# Patient Record
Sex: FEMALE | Race: WHITE | NOT HISPANIC OR LATINO | URBAN - METROPOLITAN AREA
[De-identification: names, ages, dates, MRNs, and addresses within clinical notes are randomized per-mention and may not be internally consistent; named-entity substitution may affect disease eponyms.]

---

## 2017-01-14 ENCOUNTER — INPATIENT (INPATIENT)
Facility: HOSPITAL | Age: 72
LOS: 8 days | Discharge: NURSING, REHAB OR LONG-TERM CARE | DRG: 193 | End: 2017-01-23
Attending: INTERNAL MEDICINE | Admitting: INTERNAL MEDICINE
Payer: MEDICARE

## 2017-01-14 VITALS — OXYGEN SATURATION: 100 % | HEART RATE: 111 BPM

## 2017-01-14 DIAGNOSIS — J80 ACUTE RESPIRATORY DISTRESS SYNDROME: ICD-10-CM

## 2017-01-14 DIAGNOSIS — J96.21 ACUTE AND CHRONIC RESPIRATORY FAILURE WITH HYPOXIA: ICD-10-CM

## 2017-01-14 DIAGNOSIS — J18.9 PNEUMONIA, UNSPECIFIED ORGANISM: ICD-10-CM

## 2017-01-14 PROCEDURE — 71010: CPT | Mod: 26

## 2017-01-14 PROCEDURE — 93010 ELECTROCARDIOGRAM REPORT: CPT

## 2017-01-14 PROCEDURE — 99285 EMERGENCY DEPT VISIT HI MDM: CPT

## 2017-01-14 RX ORDER — ALPRAZOLAM 0.25 MG
0.25 TABLET ORAL DAILY
Refills: 0 | Status: DISCONTINUED | OUTPATIENT
Start: 2017-01-14 | End: 2017-01-21

## 2017-01-14 RX ORDER — SODIUM CHLORIDE 9 MG/ML
1000 INJECTION INTRAMUSCULAR; INTRAVENOUS; SUBCUTANEOUS
Refills: 0 | Status: DISCONTINUED | OUTPATIENT
Start: 2017-01-14 | End: 2017-01-18

## 2017-01-14 RX ORDER — POLYETHYLENE GLYCOL 3350 17 G/17G
17 POWDER, FOR SOLUTION ORAL DAILY
Refills: 0 | Status: DISCONTINUED | OUTPATIENT
Start: 2017-01-14 | End: 2017-01-23

## 2017-01-14 RX ORDER — FLUTICASONE PROPIONATE 50 MCG
1 SPRAY, SUSPENSION NASAL DAILY
Refills: 0 | Status: DISCONTINUED | OUTPATIENT
Start: 2017-01-14 | End: 2017-01-23

## 2017-01-14 RX ORDER — ACETAMINOPHEN 500 MG
650 TABLET ORAL EVERY 6 HOURS
Refills: 0 | Status: DISCONTINUED | OUTPATIENT
Start: 2017-01-14 | End: 2017-01-23

## 2017-01-14 RX ORDER — VANCOMYCIN HCL 1 G
1000 VIAL (EA) INTRAVENOUS EVERY 12 HOURS
Refills: 0 | Status: DISCONTINUED | OUTPATIENT
Start: 2017-01-14 | End: 2017-01-16

## 2017-01-14 RX ORDER — ENOXAPARIN SODIUM 100 MG/ML
30 INJECTION SUBCUTANEOUS DAILY
Refills: 0 | Status: DISCONTINUED | OUTPATIENT
Start: 2017-01-14 | End: 2017-01-20

## 2017-01-14 RX ORDER — TRAZODONE HCL 50 MG
25 TABLET ORAL AT BEDTIME
Refills: 0 | Status: DISCONTINUED | OUTPATIENT
Start: 2017-01-14 | End: 2017-01-23

## 2017-01-14 RX ORDER — MONTELUKAST 4 MG/1
10 TABLET, CHEWABLE ORAL DAILY
Refills: 0 | Status: DISCONTINUED | OUTPATIENT
Start: 2017-01-14 | End: 2017-01-23

## 2017-01-14 RX ORDER — FLUTICASONE PROPIONATE AND SALMETEROL 50; 250 UG/1; UG/1
1 POWDER ORAL; RESPIRATORY (INHALATION)
Refills: 0 | Status: DISCONTINUED | OUTPATIENT
Start: 2017-01-14 | End: 2017-01-15

## 2017-01-14 RX ORDER — FAMOTIDINE 10 MG/ML
40 INJECTION INTRAVENOUS DAILY
Refills: 0 | Status: DISCONTINUED | OUTPATIENT
Start: 2017-01-14 | End: 2017-01-23

## 2017-01-14 RX ORDER — IPRATROPIUM/ALBUTEROL SULFATE 18-103MCG
3 AEROSOL WITH ADAPTER (GRAM) INHALATION EVERY 6 HOURS
Refills: 0 | Status: DISCONTINUED | OUTPATIENT
Start: 2017-01-14 | End: 2017-01-23

## 2017-01-14 RX ORDER — GABAPENTIN 400 MG/1
300 CAPSULE ORAL THREE TIMES A DAY
Refills: 0 | Status: DISCONTINUED | OUTPATIENT
Start: 2017-01-14 | End: 2017-01-23

## 2017-01-14 RX ADMIN — Medication 40 MILLIGRAM(S): at 14:59

## 2017-01-14 RX ADMIN — ENOXAPARIN SODIUM 30 MILLIGRAM(S): 100 INJECTION SUBCUTANEOUS at 15:16

## 2017-01-14 RX ADMIN — SODIUM CHLORIDE 100 MILLILITER(S): 9 INJECTION INTRAMUSCULAR; INTRAVENOUS; SUBCUTANEOUS at 18:12

## 2017-01-14 RX ADMIN — SODIUM CHLORIDE 100 MILLILITER(S): 9 INJECTION INTRAMUSCULAR; INTRAVENOUS; SUBCUTANEOUS at 14:59

## 2017-01-14 RX ADMIN — Medication 1 DROP(S): at 18:12

## 2017-01-14 RX ADMIN — Medication 0.25 MILLIGRAM(S): at 15:25

## 2017-01-14 RX ADMIN — Medication 250 MILLIGRAM(S): at 23:28

## 2017-01-14 RX ADMIN — GABAPENTIN 300 MILLIGRAM(S): 400 CAPSULE ORAL at 15:16

## 2017-01-14 RX ADMIN — Medication 3 MILLILITER(S): at 23:28

## 2017-01-14 RX ADMIN — Medication 1 TABLET(S): at 15:16

## 2017-01-14 RX ADMIN — GABAPENTIN 300 MILLIGRAM(S): 400 CAPSULE ORAL at 21:34

## 2017-01-14 RX ADMIN — FLUTICASONE PROPIONATE AND SALMETEROL 1 DOSE(S): 50; 250 POWDER ORAL; RESPIRATORY (INHALATION) at 18:15

## 2017-01-14 RX ADMIN — Medication 40 MILLIGRAM(S): at 21:34

## 2017-01-14 RX ADMIN — Medication 3 MILLILITER(S): at 19:41

## 2017-01-14 RX ADMIN — Medication 1 SPRAY(S): at 15:16

## 2017-01-14 RX ADMIN — MONTELUKAST 10 MILLIGRAM(S): 4 TABLET, CHEWABLE ORAL at 15:17

## 2017-01-14 RX ADMIN — FAMOTIDINE 40 MILLIGRAM(S): 10 INJECTION INTRAVENOUS at 15:16

## 2017-01-14 RX ADMIN — Medication 25 MILLIGRAM(S): at 21:34

## 2017-01-14 NOTE — H&P ADULT. - HISTORY OF PRESENT ILLNESS
This is a 70 YO W F transferred from SNF because of SOB. According to son he noticed for last few days that her breathing was not normal and possibly she was taking nebulizer treatment properly. She had  fever and she was coughing. This is a 70 YO W F transferred from SNF because of SOB. According to son he noticed for last few days that her breathing was not normal and possibly she was not getting her nebulizer treatment. She had  fever and she was coughing.

## 2017-01-15 LAB
ANION GAP SERPL CALC-SCNC: 6 MMOL/L — SIGNIFICANT CHANGE UP (ref 5–17)
ANISOCYTOSIS BLD QL: SLIGHT — SIGNIFICANT CHANGE UP
BASE EXCESS BLDA CALC-SCNC: 3.7 MMOL/L — HIGH (ref -2–2)
BLOOD GAS SOURCE: SIGNIFICANT CHANGE UP
BUN SERPL-MCNC: 16 MG/DL — SIGNIFICANT CHANGE UP (ref 7–23)
CALCIUM SERPL-MCNC: 8 MG/DL — LOW (ref 8.4–10.5)
CHLORIDE SERPL-SCNC: 104 MMOL/L — SIGNIFICANT CHANGE UP (ref 96–108)
CO2 SERPL-SCNC: 30 MMOL/L — SIGNIFICANT CHANGE UP (ref 22–31)
CREAT SERPL-MCNC: 0.72 MG/DL — SIGNIFICANT CHANGE UP (ref 0.5–1.3)
GLUCOSE SERPL-MCNC: 179 MG/DL — HIGH (ref 70–99)
HCO3 BLDA-SCNC: 27 MMOL/L — SIGNIFICANT CHANGE UP (ref 21–29)
HCT VFR BLD CALC: 33 % — LOW (ref 34.5–45)
HCT VFR BLD CALC: 34.4 % — LOW (ref 34.5–45)
HGB BLD-MCNC: 11 G/DL — LOW (ref 11.5–15.5)
HGB BLD-MCNC: 11.6 G/DL — SIGNIFICANT CHANGE UP (ref 11.5–15.5)
HOROWITZ INDEX BLDA+IHG-RTO: SIGNIFICANT CHANGE UP
HYPOCHROMIA BLD QL: SLIGHT — SIGNIFICANT CHANGE UP
LACTATE SERPL-SCNC: 1.2 MMOL/L — SIGNIFICANT CHANGE UP (ref 0.7–2)
LYMPHOCYTES # BLD AUTO: 1 % — LOW (ref 13–44)
MANUAL DIF COMMENT BLD-IMP: SIGNIFICANT CHANGE UP
MANUAL SMEAR VERIFICATION: SIGNIFICANT CHANGE UP
MCHC RBC-ENTMCNC: 30.5 PG — SIGNIFICANT CHANGE UP (ref 27–34)
MCHC RBC-ENTMCNC: 31.2 PG — SIGNIFICANT CHANGE UP (ref 27–34)
MCHC RBC-ENTMCNC: 33.2 GM/DL — SIGNIFICANT CHANGE UP (ref 32–36)
MCHC RBC-ENTMCNC: 33.8 GM/DL — SIGNIFICANT CHANGE UP (ref 32–36)
MCV RBC AUTO: 92 FL — SIGNIFICANT CHANGE UP (ref 80–100)
MCV RBC AUTO: 92.3 FL — SIGNIFICANT CHANGE UP (ref 80–100)
MONOCYTES NFR BLD AUTO: 1 % — LOW (ref 2–14)
NEUTROPHILS NFR BLD AUTO: 89 % — HIGH (ref 43–77)
NEUTS BAND # BLD: 9 % — HIGH (ref 0–8)
PCO2 BLDA: 58 MMHG — HIGH (ref 32–46)
PH BLD: 7.33 — LOW (ref 7.35–7.45)
PLAT MORPH BLD: NORMAL — SIGNIFICANT CHANGE UP
PLATELET # BLD AUTO: 215 K/UL — SIGNIFICANT CHANGE UP (ref 150–400)
PLATELET # BLD AUTO: 246 K/UL — SIGNIFICANT CHANGE UP (ref 150–400)
PO2 BLDA: 124 MMHG — HIGH (ref 74–108)
POTASSIUM SERPL-MCNC: 4 MMOL/L — SIGNIFICANT CHANGE UP (ref 3.5–5.3)
POTASSIUM SERPL-SCNC: 4 MMOL/L — SIGNIFICANT CHANGE UP (ref 3.5–5.3)
PROCALCITONIN SERPL-MCNC: 2.28 NG/ML — HIGH (ref 0–0.05)
RBC # BLD: 3.59 M/UL — LOW (ref 3.8–5.2)
RBC # BLD: 3.72 M/UL — LOW (ref 3.8–5.2)
RBC # FLD: 13.5 % — SIGNIFICANT CHANGE UP (ref 10.3–14.5)
RBC # FLD: 13.5 % — SIGNIFICANT CHANGE UP (ref 10.3–14.5)
RBC BLD AUTO: SIGNIFICANT CHANGE UP
SAO2 % BLDA: 99 % — HIGH (ref 92–96)
SODIUM SERPL-SCNC: 140 MMOL/L — SIGNIFICANT CHANGE UP (ref 135–145)
VANCOMYCIN TROUGH SERPL-MCNC: 14 UG/ML — SIGNIFICANT CHANGE UP (ref 10–20)
WBC # BLD: 15.7 K/UL — HIGH (ref 3.8–10.5)
WBC # BLD: 19.5 K/UL — HIGH (ref 3.8–10.5)
WBC # FLD AUTO: 15.7 K/UL — HIGH (ref 3.8–10.5)
WBC # FLD AUTO: 19.5 K/UL — HIGH (ref 3.8–10.5)

## 2017-01-15 PROCEDURE — 71010: CPT | Mod: 26

## 2017-01-15 RX ORDER — BUDESONIDE, MICRONIZED 100 %
0.5 POWDER (GRAM) MISCELLANEOUS
Refills: 0 | Status: DISCONTINUED | OUTPATIENT
Start: 2017-01-15 | End: 2017-01-21

## 2017-01-15 RX ORDER — ALPRAZOLAM 0.25 MG
0.25 TABLET ORAL ONCE
Refills: 0 | Status: DISCONTINUED | OUTPATIENT
Start: 2017-01-15 | End: 2017-01-15

## 2017-01-15 RX ORDER — CEFEPIME 1 G/1
INJECTION, POWDER, FOR SOLUTION INTRAMUSCULAR; INTRAVENOUS
Refills: 0 | Status: DISCONTINUED | OUTPATIENT
Start: 2017-01-15 | End: 2017-01-18

## 2017-01-15 RX ORDER — IPRATROPIUM/ALBUTEROL SULFATE 18-103MCG
3 AEROSOL WITH ADAPTER (GRAM) INHALATION EVERY 4 HOURS
Refills: 0 | Status: DISCONTINUED | OUTPATIENT
Start: 2017-01-15 | End: 2017-01-23

## 2017-01-15 RX ORDER — CEFEPIME 1 G/1
2000 INJECTION, POWDER, FOR SOLUTION INTRAMUSCULAR; INTRAVENOUS ONCE
Refills: 0 | Status: COMPLETED | OUTPATIENT
Start: 2017-01-15 | End: 2017-01-15

## 2017-01-15 RX ORDER — HYDROCORTISONE 20 MG
125 TABLET ORAL ONCE
Refills: 0 | Status: DISCONTINUED | OUTPATIENT
Start: 2017-01-15 | End: 2017-01-15

## 2017-01-15 RX ORDER — CEFEPIME 1 G/1
2000 INJECTION, POWDER, FOR SOLUTION INTRAMUSCULAR; INTRAVENOUS EVERY 24 HOURS
Refills: 0 | Status: DISCONTINUED | OUTPATIENT
Start: 2017-01-16 | End: 2017-01-18

## 2017-01-15 RX ADMIN — SODIUM CHLORIDE 40 MILLILITER(S): 9 INJECTION INTRAMUSCULAR; INTRAVENOUS; SUBCUTANEOUS at 16:30

## 2017-01-15 RX ADMIN — Medication 125 MILLIGRAM(S): at 10:12

## 2017-01-15 RX ADMIN — Medication 1 DROP(S): at 05:51

## 2017-01-15 RX ADMIN — Medication 40 MILLIGRAM(S): at 14:43

## 2017-01-15 RX ADMIN — Medication 1 DROP(S): at 18:21

## 2017-01-15 RX ADMIN — GABAPENTIN 300 MILLIGRAM(S): 400 CAPSULE ORAL at 22:13

## 2017-01-15 RX ADMIN — GABAPENTIN 300 MILLIGRAM(S): 400 CAPSULE ORAL at 14:43

## 2017-01-15 RX ADMIN — Medication 3 MILLILITER(S): at 23:54

## 2017-01-15 RX ADMIN — Medication 0.25 MILLIGRAM(S): at 22:13

## 2017-01-15 RX ADMIN — Medication 40 MILLIGRAM(S): at 22:13

## 2017-01-15 RX ADMIN — Medication 3 MILLILITER(S): at 07:18

## 2017-01-15 RX ADMIN — Medication 0.25 MILLIGRAM(S): at 11:46

## 2017-01-15 RX ADMIN — FAMOTIDINE 40 MILLIGRAM(S): 10 INJECTION INTRAVENOUS at 11:46

## 2017-01-15 RX ADMIN — Medication 250 MILLIGRAM(S): at 12:49

## 2017-01-15 RX ADMIN — Medication 40 MILLIGRAM(S): at 05:52

## 2017-01-15 RX ADMIN — Medication 3 MILLILITER(S): at 20:08

## 2017-01-15 RX ADMIN — Medication 250 MILLIGRAM(S): at 23:36

## 2017-01-15 RX ADMIN — Medication 0.5 MILLIGRAM(S): at 20:08

## 2017-01-15 RX ADMIN — Medication 3 MILLILITER(S): at 09:15

## 2017-01-15 RX ADMIN — Medication 1 TABLET(S): at 11:46

## 2017-01-15 RX ADMIN — FLUTICASONE PROPIONATE AND SALMETEROL 1 DOSE(S): 50; 250 POWDER ORAL; RESPIRATORY (INHALATION) at 05:52

## 2017-01-15 RX ADMIN — Medication 3 MILLILITER(S): at 13:53

## 2017-01-15 RX ADMIN — MONTELUKAST 10 MILLIGRAM(S): 4 TABLET, CHEWABLE ORAL at 11:46

## 2017-01-15 RX ADMIN — CEFEPIME 100 MILLIGRAM(S): 1 INJECTION, POWDER, FOR SOLUTION INTRAMUSCULAR; INTRAVENOUS at 11:52

## 2017-01-15 RX ADMIN — ENOXAPARIN SODIUM 30 MILLIGRAM(S): 100 INJECTION SUBCUTANEOUS at 11:45

## 2017-01-15 RX ADMIN — GABAPENTIN 300 MILLIGRAM(S): 400 CAPSULE ORAL at 05:51

## 2017-01-15 RX ADMIN — Medication 650 MILLIGRAM(S): at 11:00

## 2017-01-15 RX ADMIN — Medication 25 MILLIGRAM(S): at 22:13

## 2017-01-15 NOTE — PROVIDER CONTACT NOTE (EICU) - BACKGROUND
Pt admitted to [facility] ICU from the floor for worsened resp status. placed on bipap on the floor.discussed with the nurse  to get an abg, repeat labs and to notify Dr. Ortiz that his patient is now in the ICU with worsened status. She looks to be in mild to mod distress. +accessory muscle use and tachypnea. ill fitting BIPAP mask due to facial structure. Attempting to find one to get a better fit. decreased IVF. she is on abx, steroids and bronchodilators.

## 2017-01-16 LAB
ANION GAP SERPL CALC-SCNC: 4 MMOL/L — LOW (ref 5–17)
BASOPHILS # BLD AUTO: 0 K/UL — SIGNIFICANT CHANGE UP (ref 0–0.2)
BASOPHILS NFR BLD AUTO: 0.2 % — SIGNIFICANT CHANGE UP (ref 0–2)
BUN SERPL-MCNC: 14 MG/DL — SIGNIFICANT CHANGE UP (ref 7–23)
CALCIUM SERPL-MCNC: 8.2 MG/DL — LOW (ref 8.4–10.5)
CHLORIDE SERPL-SCNC: 106 MMOL/L — SIGNIFICANT CHANGE UP (ref 96–108)
CO2 SERPL-SCNC: 33 MMOL/L — HIGH (ref 22–31)
CREAT SERPL-MCNC: 0.67 MG/DL — SIGNIFICANT CHANGE UP (ref 0.5–1.3)
EOSINOPHIL # BLD AUTO: 0 K/UL — SIGNIFICANT CHANGE UP (ref 0–0.5)
EOSINOPHIL NFR BLD AUTO: 0 % — SIGNIFICANT CHANGE UP (ref 0–6)
GLUCOSE SERPL-MCNC: 133 MG/DL — HIGH (ref 70–99)
HCT VFR BLD CALC: 33.2 % — LOW (ref 34.5–45)
HGB BLD-MCNC: 10.5 G/DL — LOW (ref 11.5–15.5)
LYMPHOCYTES # BLD AUTO: 0.3 K/UL — LOW (ref 1–3.3)
LYMPHOCYTES # BLD AUTO: 3.3 % — LOW (ref 13–44)
MAGNESIUM SERPL-MCNC: 2.8 MG/DL — HIGH (ref 1.6–2.6)
MCHC RBC-ENTMCNC: 29.8 PG — SIGNIFICANT CHANGE UP (ref 27–34)
MCHC RBC-ENTMCNC: 31.6 GM/DL — LOW (ref 32–36)
MCV RBC AUTO: 94.3 FL — SIGNIFICANT CHANGE UP (ref 80–100)
MONOCYTES # BLD AUTO: 0.4 K/UL — SIGNIFICANT CHANGE UP (ref 0–0.9)
MONOCYTES NFR BLD AUTO: 5.4 % — SIGNIFICANT CHANGE UP (ref 2–14)
MRSA PCR RESULT.: SIGNIFICANT CHANGE UP
NEUTROPHILS # BLD AUTO: 7.6 K/UL — HIGH (ref 1.8–7.4)
NEUTROPHILS NFR BLD AUTO: 91.1 % — HIGH (ref 43–77)
PLATELET # BLD AUTO: 189 K/UL — SIGNIFICANT CHANGE UP (ref 150–400)
POTASSIUM SERPL-MCNC: 4.2 MMOL/L — SIGNIFICANT CHANGE UP (ref 3.5–5.3)
POTASSIUM SERPL-SCNC: 4.2 MMOL/L — SIGNIFICANT CHANGE UP (ref 3.5–5.3)
RBC # BLD: 3.52 M/UL — LOW (ref 3.8–5.2)
RBC # FLD: 13.4 % — SIGNIFICANT CHANGE UP (ref 10.3–14.5)
S AUREUS DNA NOSE QL NAA+PROBE: SIGNIFICANT CHANGE UP
SODIUM SERPL-SCNC: 143 MMOL/L — SIGNIFICANT CHANGE UP (ref 135–145)
WBC # BLD: 8.3 K/UL — SIGNIFICANT CHANGE UP (ref 3.8–10.5)
WBC # FLD AUTO: 8.3 K/UL — SIGNIFICANT CHANGE UP (ref 3.8–10.5)

## 2017-01-16 PROCEDURE — 71010: CPT | Mod: 26

## 2017-01-16 RX ORDER — ALPRAZOLAM 0.25 MG
0.25 TABLET ORAL EVERY 6 HOURS
Refills: 0 | Status: DISCONTINUED | OUTPATIENT
Start: 2017-01-16 | End: 2017-01-23

## 2017-01-16 RX ORDER — OXYCODONE HYDROCHLORIDE 5 MG/1
5 TABLET ORAL EVERY 6 HOURS
Refills: 0 | Status: DISCONTINUED | OUTPATIENT
Start: 2017-01-16 | End: 2017-01-23

## 2017-01-16 RX ADMIN — Medication 1 DROP(S): at 17:54

## 2017-01-16 RX ADMIN — Medication 3 MILLILITER(S): at 14:06

## 2017-01-16 RX ADMIN — Medication 0.25 MILLIGRAM(S): at 11:15

## 2017-01-16 RX ADMIN — POLYETHYLENE GLYCOL 3350 17 GRAM(S): 17 POWDER, FOR SOLUTION ORAL at 11:15

## 2017-01-16 RX ADMIN — Medication 3 MILLILITER(S): at 20:27

## 2017-01-16 RX ADMIN — Medication 650 MILLIGRAM(S): at 22:56

## 2017-01-16 RX ADMIN — Medication 650 MILLIGRAM(S): at 12:56

## 2017-01-16 RX ADMIN — Medication 0.5 MILLIGRAM(S): at 07:30

## 2017-01-16 RX ADMIN — CEFEPIME 100 MILLIGRAM(S): 1 INJECTION, POWDER, FOR SOLUTION INTRAMUSCULAR; INTRAVENOUS at 11:14

## 2017-01-16 RX ADMIN — Medication 1 DROP(S): at 06:02

## 2017-01-16 RX ADMIN — MONTELUKAST 10 MILLIGRAM(S): 4 TABLET, CHEWABLE ORAL at 12:19

## 2017-01-16 RX ADMIN — GABAPENTIN 300 MILLIGRAM(S): 400 CAPSULE ORAL at 14:02

## 2017-01-16 RX ADMIN — Medication 40 MILLIGRAM(S): at 14:03

## 2017-01-16 RX ADMIN — Medication 0.5 MILLIGRAM(S): at 20:27

## 2017-01-16 RX ADMIN — Medication 1 SPRAY(S): at 11:15

## 2017-01-16 RX ADMIN — Medication 1 TABLET(S): at 12:19

## 2017-01-16 RX ADMIN — SODIUM CHLORIDE 40 MILLILITER(S): 9 INJECTION INTRAMUSCULAR; INTRAVENOUS; SUBCUTANEOUS at 06:03

## 2017-01-16 RX ADMIN — GABAPENTIN 300 MILLIGRAM(S): 400 CAPSULE ORAL at 07:38

## 2017-01-16 RX ADMIN — SODIUM CHLORIDE 40 MILLILITER(S): 9 INJECTION INTRAMUSCULAR; INTRAVENOUS; SUBCUTANEOUS at 07:58

## 2017-01-16 RX ADMIN — FAMOTIDINE 40 MILLIGRAM(S): 10 INJECTION INTRAVENOUS at 12:19

## 2017-01-16 RX ADMIN — Medication 3 MILLILITER(S): at 23:27

## 2017-01-16 RX ADMIN — Medication 40 MILLIGRAM(S): at 06:02

## 2017-01-16 RX ADMIN — Medication 3 MILLILITER(S): at 07:30

## 2017-01-16 RX ADMIN — GABAPENTIN 300 MILLIGRAM(S): 400 CAPSULE ORAL at 22:55

## 2017-01-16 RX ADMIN — Medication 40 MILLIGRAM(S): at 22:55

## 2017-01-16 RX ADMIN — Medication 25 MILLIGRAM(S): at 22:56

## 2017-01-16 RX ADMIN — ENOXAPARIN SODIUM 30 MILLIGRAM(S): 100 INJECTION SUBCUTANEOUS at 11:15

## 2017-01-16 NOTE — DIETITIAN INITIAL EVALUATION ADULT. - OTHER INFO
71F adm from [facility] SNF c SOB and cough. Found to have PNA, +flu, and COPD exacerbation. +Bipap. Pt reports SOB improving slowly and c/o lack of appetite. States appetite had been good up until about a week ago when she started feeling unwell. On Low Na diet at [facility]. Denies chewing or swallowing difficulties. Presently on soft consistency secondary to increased resp distress. Pt agreeable to Ensure pudding as she states she has had in past; denies ensure plus at this time. Encouraged po intake and obtained preferences. Skin intact.

## 2017-01-17 LAB
ANION GAP SERPL CALC-SCNC: 4 MMOL/L — LOW (ref 5–17)
ANISOCYTOSIS BLD QL: SLIGHT — SIGNIFICANT CHANGE UP
BASOPHILS # BLD AUTO: 0 K/UL — SIGNIFICANT CHANGE UP (ref 0–0.2)
BUN SERPL-MCNC: 22 MG/DL — SIGNIFICANT CHANGE UP (ref 7–23)
CALCIUM SERPL-MCNC: 8.3 MG/DL — LOW (ref 8.4–10.5)
CHLORIDE SERPL-SCNC: 106 MMOL/L — SIGNIFICANT CHANGE UP (ref 96–108)
CO2 SERPL-SCNC: 33 MMOL/L — HIGH (ref 22–31)
CREAT SERPL-MCNC: 0.63 MG/DL — SIGNIFICANT CHANGE UP (ref 0.5–1.3)
EOSINOPHIL # BLD AUTO: 0 K/UL — SIGNIFICANT CHANGE UP (ref 0–0.5)
GLUCOSE SERPL-MCNC: 139 MG/DL — HIGH (ref 70–99)
HCT VFR BLD CALC: 32.2 % — LOW (ref 34.5–45)
HGB BLD-MCNC: 10.6 G/DL — LOW (ref 11.5–15.5)
LYMPHOCYTES # BLD AUTO: 0.4 K/UL — LOW (ref 1–3.3)
LYMPHOCYTES # BLD AUTO: 12 % — LOW (ref 13–44)
MAGNESIUM SERPL-MCNC: 2.6 MG/DL — SIGNIFICANT CHANGE UP (ref 1.6–2.6)
MANUAL DIF COMMENT BLD-IMP: SIGNIFICANT CHANGE UP
MCHC RBC-ENTMCNC: 30.4 PG — SIGNIFICANT CHANGE UP (ref 27–34)
MCHC RBC-ENTMCNC: 32.8 GM/DL — SIGNIFICANT CHANGE UP (ref 32–36)
MCV RBC AUTO: 92.7 FL — SIGNIFICANT CHANGE UP (ref 80–100)
MONOCYTES # BLD AUTO: 0.4 K/UL — SIGNIFICANT CHANGE UP (ref 0–0.9)
MONOCYTES NFR BLD AUTO: 3 % — SIGNIFICANT CHANGE UP (ref 2–14)
NEUTROPHILS # BLD AUTO: 6.1 K/UL — SIGNIFICANT CHANGE UP (ref 1.8–7.4)
NEUTROPHILS NFR BLD AUTO: 83 % — HIGH (ref 43–77)
NEUTS BAND # BLD: 2 % — SIGNIFICANT CHANGE UP (ref 0–8)
PLAT MORPH BLD: NORMAL — SIGNIFICANT CHANGE UP
PLATELET # BLD AUTO: 209 K/UL — SIGNIFICANT CHANGE UP (ref 150–400)
POIKILOCYTOSIS BLD QL AUTO: SLIGHT — SIGNIFICANT CHANGE UP
POTASSIUM SERPL-MCNC: 4.2 MMOL/L — SIGNIFICANT CHANGE UP (ref 3.5–5.3)
POTASSIUM SERPL-SCNC: 4.2 MMOL/L — SIGNIFICANT CHANGE UP (ref 3.5–5.3)
RBC # BLD: 3.47 M/UL — LOW (ref 3.8–5.2)
RBC # FLD: 13.9 % — SIGNIFICANT CHANGE UP (ref 10.3–14.5)
RBC BLD AUTO: ABNORMAL
SODIUM SERPL-SCNC: 143 MMOL/L — SIGNIFICANT CHANGE UP (ref 135–145)
WBC # BLD: 6.9 K/UL — SIGNIFICANT CHANGE UP (ref 3.8–10.5)
WBC # FLD AUTO: 6.9 K/UL — SIGNIFICANT CHANGE UP (ref 3.8–10.5)

## 2017-01-17 RX ADMIN — Medication 40 MILLIGRAM(S): at 13:47

## 2017-01-17 RX ADMIN — GABAPENTIN 300 MILLIGRAM(S): 400 CAPSULE ORAL at 05:20

## 2017-01-17 RX ADMIN — GABAPENTIN 300 MILLIGRAM(S): 400 CAPSULE ORAL at 13:48

## 2017-01-17 RX ADMIN — GABAPENTIN 300 MILLIGRAM(S): 400 CAPSULE ORAL at 21:28

## 2017-01-17 RX ADMIN — FAMOTIDINE 40 MILLIGRAM(S): 10 INJECTION INTRAVENOUS at 12:16

## 2017-01-17 RX ADMIN — Medication 1 DROP(S): at 05:21

## 2017-01-17 RX ADMIN — Medication 25 MILLIGRAM(S): at 21:28

## 2017-01-17 RX ADMIN — CEFEPIME 100 MILLIGRAM(S): 1 INJECTION, POWDER, FOR SOLUTION INTRAMUSCULAR; INTRAVENOUS at 11:52

## 2017-01-17 RX ADMIN — Medication 3 MILLILITER(S): at 20:34

## 2017-01-17 RX ADMIN — Medication 1 DROP(S): at 18:45

## 2017-01-17 RX ADMIN — Medication 3 MILLILITER(S): at 07:26

## 2017-01-17 RX ADMIN — SODIUM CHLORIDE 40 MILLILITER(S): 9 INJECTION INTRAMUSCULAR; INTRAVENOUS; SUBCUTANEOUS at 05:20

## 2017-01-17 RX ADMIN — OXYCODONE HYDROCHLORIDE 5 MILLIGRAM(S): 5 TABLET ORAL at 22:22

## 2017-01-17 RX ADMIN — Medication 0.25 MILLIGRAM(S): at 20:13

## 2017-01-17 RX ADMIN — Medication 3 MILLILITER(S): at 13:15

## 2017-01-17 RX ADMIN — Medication 0.5 MILLIGRAM(S): at 07:26

## 2017-01-17 RX ADMIN — Medication 1 TABLET(S): at 12:16

## 2017-01-17 RX ADMIN — Medication 0.25 MILLIGRAM(S): at 12:16

## 2017-01-17 RX ADMIN — ENOXAPARIN SODIUM 30 MILLIGRAM(S): 100 INJECTION SUBCUTANEOUS at 12:16

## 2017-01-17 RX ADMIN — Medication 1 SPRAY(S): at 12:16

## 2017-01-17 RX ADMIN — Medication 40 MILLIGRAM(S): at 21:28

## 2017-01-17 RX ADMIN — Medication 40 MILLIGRAM(S): at 05:20

## 2017-01-17 RX ADMIN — MONTELUKAST 10 MILLIGRAM(S): 4 TABLET, CHEWABLE ORAL at 12:16

## 2017-01-17 RX ADMIN — OXYCODONE HYDROCHLORIDE 5 MILLIGRAM(S): 5 TABLET ORAL at 23:00

## 2017-01-18 LAB
BASOPHILS # BLD AUTO: 0 K/UL — SIGNIFICANT CHANGE UP (ref 0–0.2)
BASOPHILS NFR BLD AUTO: 0.6 % — SIGNIFICANT CHANGE UP (ref 0–2)
EOSINOPHIL # BLD AUTO: 0 K/UL — SIGNIFICANT CHANGE UP (ref 0–0.5)
EOSINOPHIL NFR BLD AUTO: 0.2 % — SIGNIFICANT CHANGE UP (ref 0–6)
HCT VFR BLD CALC: 34.6 % — SIGNIFICANT CHANGE UP (ref 34.5–45)
HGB BLD-MCNC: 11 G/DL — LOW (ref 11.5–15.5)
LYMPHOCYTES # BLD AUTO: 0.3 K/UL — LOW (ref 1–3.3)
LYMPHOCYTES # BLD AUTO: 4.9 % — LOW (ref 13–44)
MCHC RBC-ENTMCNC: 30.4 PG — SIGNIFICANT CHANGE UP (ref 27–34)
MCHC RBC-ENTMCNC: 32 GM/DL — SIGNIFICANT CHANGE UP (ref 32–36)
MCV RBC AUTO: 95.2 FL — SIGNIFICANT CHANGE UP (ref 80–100)
MONOCYTES # BLD AUTO: 0.4 K/UL — SIGNIFICANT CHANGE UP (ref 0–0.9)
MONOCYTES NFR BLD AUTO: 6.8 % — SIGNIFICANT CHANGE UP (ref 2–14)
NEUTROPHILS # BLD AUTO: 5.5 K/UL — SIGNIFICANT CHANGE UP (ref 1.8–7.4)
NEUTROPHILS NFR BLD AUTO: 87.5 % — HIGH (ref 43–77)
PLATELET # BLD AUTO: 215 K/UL — SIGNIFICANT CHANGE UP (ref 150–400)
RBC # BLD: 3.63 M/UL — LOW (ref 3.8–5.2)
RBC # FLD: 13.1 % — SIGNIFICANT CHANGE UP (ref 10.3–14.5)
WBC # BLD: 6.3 K/UL — SIGNIFICANT CHANGE UP (ref 3.8–10.5)
WBC # FLD AUTO: 6.3 K/UL — SIGNIFICANT CHANGE UP (ref 3.8–10.5)

## 2017-01-18 PROCEDURE — 71010: CPT | Mod: 26

## 2017-01-18 RX ADMIN — ENOXAPARIN SODIUM 30 MILLIGRAM(S): 100 INJECTION SUBCUTANEOUS at 11:32

## 2017-01-18 RX ADMIN — GABAPENTIN 300 MILLIGRAM(S): 400 CAPSULE ORAL at 21:52

## 2017-01-18 RX ADMIN — Medication 0.25 MILLIGRAM(S): at 21:52

## 2017-01-18 RX ADMIN — POLYETHYLENE GLYCOL 3350 17 GRAM(S): 17 POWDER, FOR SOLUTION ORAL at 11:32

## 2017-01-18 RX ADMIN — Medication 3 MILLILITER(S): at 07:56

## 2017-01-18 RX ADMIN — Medication 1 SPRAY(S): at 11:32

## 2017-01-18 RX ADMIN — Medication 1 DROP(S): at 17:19

## 2017-01-18 RX ADMIN — Medication 0.5 MILLIGRAM(S): at 07:56

## 2017-01-18 RX ADMIN — Medication 25 MILLIGRAM(S): at 21:53

## 2017-01-18 RX ADMIN — Medication 0.25 MILLIGRAM(S): at 04:01

## 2017-01-18 RX ADMIN — Medication 1 TABLET(S): at 11:32

## 2017-01-18 RX ADMIN — Medication 0.5 MILLIGRAM(S): at 20:08

## 2017-01-18 RX ADMIN — Medication 0.25 MILLIGRAM(S): at 11:32

## 2017-01-18 RX ADMIN — Medication 40 MILLIGRAM(S): at 13:29

## 2017-01-18 RX ADMIN — Medication 3 MILLILITER(S): at 20:06

## 2017-01-18 RX ADMIN — MONTELUKAST 10 MILLIGRAM(S): 4 TABLET, CHEWABLE ORAL at 11:31

## 2017-01-18 RX ADMIN — SODIUM CHLORIDE 40 MILLILITER(S): 9 INJECTION INTRAMUSCULAR; INTRAVENOUS; SUBCUTANEOUS at 07:50

## 2017-01-18 RX ADMIN — Medication 40 MILLIGRAM(S): at 06:12

## 2017-01-18 RX ADMIN — GABAPENTIN 300 MILLIGRAM(S): 400 CAPSULE ORAL at 06:12

## 2017-01-18 RX ADMIN — Medication 1 DROP(S): at 06:12

## 2017-01-18 RX ADMIN — Medication 3 MILLILITER(S): at 13:37

## 2017-01-18 RX ADMIN — FAMOTIDINE 40 MILLIGRAM(S): 10 INJECTION INTRAVENOUS at 11:32

## 2017-01-18 RX ADMIN — Medication 40 MILLIGRAM(S): at 21:51

## 2017-01-18 RX ADMIN — GABAPENTIN 300 MILLIGRAM(S): 400 CAPSULE ORAL at 13:29

## 2017-01-19 RX ADMIN — Medication 0.5 MILLIGRAM(S): at 20:13

## 2017-01-19 RX ADMIN — Medication 1 TABLET(S): at 12:30

## 2017-01-19 RX ADMIN — Medication 3 MILLILITER(S): at 20:13

## 2017-01-19 RX ADMIN — OXYCODONE HYDROCHLORIDE 5 MILLIGRAM(S): 5 TABLET ORAL at 03:30

## 2017-01-19 RX ADMIN — Medication 25 MILLIGRAM(S): at 22:03

## 2017-01-19 RX ADMIN — Medication 1 DROP(S): at 17:37

## 2017-01-19 RX ADMIN — Medication 0.5 MILLIGRAM(S): at 01:08

## 2017-01-19 RX ADMIN — GABAPENTIN 300 MILLIGRAM(S): 400 CAPSULE ORAL at 13:42

## 2017-01-19 RX ADMIN — FAMOTIDINE 40 MILLIGRAM(S): 10 INJECTION INTRAVENOUS at 12:30

## 2017-01-19 RX ADMIN — GABAPENTIN 300 MILLIGRAM(S): 400 CAPSULE ORAL at 22:03

## 2017-01-19 RX ADMIN — OXYCODONE HYDROCHLORIDE 5 MILLIGRAM(S): 5 TABLET ORAL at 22:38

## 2017-01-19 RX ADMIN — Medication 0.5 MILLIGRAM(S): at 07:43

## 2017-01-19 RX ADMIN — ENOXAPARIN SODIUM 30 MILLIGRAM(S): 100 INJECTION SUBCUTANEOUS at 12:31

## 2017-01-19 RX ADMIN — Medication 0.25 MILLIGRAM(S): at 22:03

## 2017-01-19 RX ADMIN — Medication 1 DROP(S): at 05:52

## 2017-01-19 RX ADMIN — Medication 1 SPRAY(S): at 12:30

## 2017-01-19 RX ADMIN — Medication 50 MILLIGRAM(S): at 18:20

## 2017-01-19 RX ADMIN — Medication 3 MILLILITER(S): at 13:40

## 2017-01-19 RX ADMIN — MONTELUKAST 10 MILLIGRAM(S): 4 TABLET, CHEWABLE ORAL at 12:30

## 2017-01-19 RX ADMIN — Medication 3 MILLILITER(S): at 07:43

## 2017-01-19 RX ADMIN — Medication 40 MILLIGRAM(S): at 05:52

## 2017-01-19 RX ADMIN — Medication 3 MILLILITER(S): at 01:06

## 2017-01-19 RX ADMIN — POLYETHYLENE GLYCOL 3350 17 GRAM(S): 17 POWDER, FOR SOLUTION ORAL at 12:30

## 2017-01-19 RX ADMIN — OXYCODONE HYDROCHLORIDE 5 MILLIGRAM(S): 5 TABLET ORAL at 02:46

## 2017-01-19 RX ADMIN — GABAPENTIN 300 MILLIGRAM(S): 400 CAPSULE ORAL at 05:52

## 2017-01-19 RX ADMIN — Medication 0.25 MILLIGRAM(S): at 10:38

## 2017-01-20 LAB
ANION GAP SERPL CALC-SCNC: 3 MMOL/L — LOW (ref 5–17)
BASOPHILS # BLD AUTO: 0 K/UL — SIGNIFICANT CHANGE UP (ref 0–0.2)
BASOPHILS NFR BLD AUTO: 0.4 % — SIGNIFICANT CHANGE UP (ref 0–2)
BUN SERPL-MCNC: 18 MG/DL — SIGNIFICANT CHANGE UP (ref 7–23)
CALCIUM SERPL-MCNC: 8.8 MG/DL — SIGNIFICANT CHANGE UP (ref 8.4–10.5)
CHLORIDE SERPL-SCNC: 103 MMOL/L — SIGNIFICANT CHANGE UP (ref 96–108)
CO2 SERPL-SCNC: 37 MMOL/L — HIGH (ref 22–31)
CREAT SERPL-MCNC: 0.71 MG/DL — SIGNIFICANT CHANGE UP (ref 0.5–1.3)
EOSINOPHIL # BLD AUTO: 0 K/UL — SIGNIFICANT CHANGE UP (ref 0–0.5)
EOSINOPHIL NFR BLD AUTO: 0.1 % — SIGNIFICANT CHANGE UP (ref 0–6)
GLUCOSE SERPL-MCNC: 137 MG/DL — HIGH (ref 70–99)
HCT VFR BLD CALC: 35.2 % — SIGNIFICANT CHANGE UP (ref 34.5–45)
HGB BLD-MCNC: 11.4 G/DL — LOW (ref 11.5–15.5)
LYMPHOCYTES # BLD AUTO: 0.4 K/UL — LOW (ref 1–3.3)
LYMPHOCYTES # BLD AUTO: 5.7 % — LOW (ref 13–44)
MAGNESIUM SERPL-MCNC: 2.2 MG/DL — SIGNIFICANT CHANGE UP (ref 1.6–2.6)
MCHC RBC-ENTMCNC: 30.8 PG — SIGNIFICANT CHANGE UP (ref 27–34)
MCHC RBC-ENTMCNC: 32.3 GM/DL — SIGNIFICANT CHANGE UP (ref 32–36)
MCV RBC AUTO: 95.3 FL — SIGNIFICANT CHANGE UP (ref 80–100)
MONOCYTES # BLD AUTO: 0.4 K/UL — SIGNIFICANT CHANGE UP (ref 0–0.9)
MONOCYTES NFR BLD AUTO: 6.9 % — SIGNIFICANT CHANGE UP (ref 2–14)
NEUTROPHILS # BLD AUTO: 5.5 K/UL — SIGNIFICANT CHANGE UP (ref 1.8–7.4)
NEUTROPHILS NFR BLD AUTO: 86.9 % — HIGH (ref 43–77)
PLATELET # BLD AUTO: 236 K/UL — SIGNIFICANT CHANGE UP (ref 150–400)
POTASSIUM SERPL-MCNC: 4.2 MMOL/L — SIGNIFICANT CHANGE UP (ref 3.5–5.3)
POTASSIUM SERPL-SCNC: 4.2 MMOL/L — SIGNIFICANT CHANGE UP (ref 3.5–5.3)
RBC # BLD: 3.69 M/UL — LOW (ref 3.8–5.2)
RBC # FLD: 13.1 % — SIGNIFICANT CHANGE UP (ref 10.3–14.5)
SODIUM SERPL-SCNC: 143 MMOL/L — SIGNIFICANT CHANGE UP (ref 135–145)
WBC # BLD: 6.3 K/UL — SIGNIFICANT CHANGE UP (ref 3.8–10.5)
WBC # FLD AUTO: 6.3 K/UL — SIGNIFICANT CHANGE UP (ref 3.8–10.5)

## 2017-01-20 RX ORDER — ENOXAPARIN SODIUM 100 MG/ML
40 INJECTION SUBCUTANEOUS DAILY
Refills: 0 | Status: DISCONTINUED | OUTPATIENT
Start: 2017-01-21 | End: 2017-01-23

## 2017-01-20 RX ADMIN — MONTELUKAST 10 MILLIGRAM(S): 4 TABLET, CHEWABLE ORAL at 12:06

## 2017-01-20 RX ADMIN — OXYCODONE HYDROCHLORIDE 5 MILLIGRAM(S): 5 TABLET ORAL at 10:59

## 2017-01-20 RX ADMIN — Medication 1 DROP(S): at 06:21

## 2017-01-20 RX ADMIN — GABAPENTIN 300 MILLIGRAM(S): 400 CAPSULE ORAL at 14:39

## 2017-01-20 RX ADMIN — Medication 0.5 MILLIGRAM(S): at 19:38

## 2017-01-20 RX ADMIN — Medication 0.25 MILLIGRAM(S): at 12:06

## 2017-01-20 RX ADMIN — Medication 1 SPRAY(S): at 12:07

## 2017-01-20 RX ADMIN — OXYCODONE HYDROCHLORIDE 5 MILLIGRAM(S): 5 TABLET ORAL at 09:36

## 2017-01-20 RX ADMIN — FAMOTIDINE 40 MILLIGRAM(S): 10 INJECTION INTRAVENOUS at 12:06

## 2017-01-20 RX ADMIN — OXYCODONE HYDROCHLORIDE 5 MILLIGRAM(S): 5 TABLET ORAL at 21:30

## 2017-01-20 RX ADMIN — GABAPENTIN 300 MILLIGRAM(S): 400 CAPSULE ORAL at 21:31

## 2017-01-20 RX ADMIN — Medication 1 DROP(S): at 17:46

## 2017-01-20 RX ADMIN — OXYCODONE HYDROCHLORIDE 5 MILLIGRAM(S): 5 TABLET ORAL at 15:30

## 2017-01-20 RX ADMIN — Medication 0.25 MILLIGRAM(S): at 09:27

## 2017-01-20 RX ADMIN — Medication 25 MILLIGRAM(S): at 21:30

## 2017-01-20 RX ADMIN — OXYCODONE HYDROCHLORIDE 5 MILLIGRAM(S): 5 TABLET ORAL at 22:00

## 2017-01-20 RX ADMIN — Medication 0.25 MILLIGRAM(S): at 17:51

## 2017-01-20 RX ADMIN — ENOXAPARIN SODIUM 30 MILLIGRAM(S): 100 INJECTION SUBCUTANEOUS at 12:05

## 2017-01-20 RX ADMIN — OXYCODONE HYDROCHLORIDE 5 MILLIGRAM(S): 5 TABLET ORAL at 14:38

## 2017-01-20 RX ADMIN — Medication 0.5 MILLIGRAM(S): at 07:49

## 2017-01-20 RX ADMIN — GABAPENTIN 300 MILLIGRAM(S): 400 CAPSULE ORAL at 06:21

## 2017-01-20 RX ADMIN — Medication 1 TABLET(S): at 12:05

## 2017-01-20 RX ADMIN — Medication 50 MILLIGRAM(S): at 06:21

## 2017-01-20 RX ADMIN — Medication 3 MILLILITER(S): at 19:38

## 2017-01-20 RX ADMIN — Medication 3 MILLILITER(S): at 13:59

## 2017-01-20 RX ADMIN — Medication 3 MILLILITER(S): at 00:58

## 2017-01-20 RX ADMIN — OXYCODONE HYDROCHLORIDE 5 MILLIGRAM(S): 5 TABLET ORAL at 01:01

## 2017-01-20 RX ADMIN — Medication 3 MILLILITER(S): at 07:49

## 2017-01-21 RX ORDER — FLUTICASONE PROPIONATE AND SALMETEROL 50; 250 UG/1; UG/1
1 POWDER ORAL; RESPIRATORY (INHALATION)
Refills: 0 | Status: DISCONTINUED | OUTPATIENT
Start: 2017-01-21 | End: 2017-01-23

## 2017-01-21 RX ADMIN — FAMOTIDINE 40 MILLIGRAM(S): 10 INJECTION INTRAVENOUS at 11:02

## 2017-01-21 RX ADMIN — OXYCODONE HYDROCHLORIDE 5 MILLIGRAM(S): 5 TABLET ORAL at 18:15

## 2017-01-21 RX ADMIN — FLUTICASONE PROPIONATE AND SALMETEROL 1 DOSE(S): 50; 250 POWDER ORAL; RESPIRATORY (INHALATION) at 09:10

## 2017-01-21 RX ADMIN — Medication 3 MILLILITER(S): at 19:40

## 2017-01-21 RX ADMIN — Medication 0.25 MILLIGRAM(S): at 11:01

## 2017-01-21 RX ADMIN — Medication 50 MILLIGRAM(S): at 06:07

## 2017-01-21 RX ADMIN — MONTELUKAST 10 MILLIGRAM(S): 4 TABLET, CHEWABLE ORAL at 11:00

## 2017-01-21 RX ADMIN — ENOXAPARIN SODIUM 40 MILLIGRAM(S): 100 INJECTION SUBCUTANEOUS at 11:02

## 2017-01-21 RX ADMIN — Medication 1 DROP(S): at 18:14

## 2017-01-21 RX ADMIN — GABAPENTIN 300 MILLIGRAM(S): 400 CAPSULE ORAL at 21:34

## 2017-01-21 RX ADMIN — Medication 3 MILLILITER(S): at 13:18

## 2017-01-21 RX ADMIN — Medication 1 SPRAY(S): at 11:00

## 2017-01-21 RX ADMIN — GABAPENTIN 300 MILLIGRAM(S): 400 CAPSULE ORAL at 06:07

## 2017-01-21 RX ADMIN — Medication 3 MILLILITER(S): at 07:53

## 2017-01-21 RX ADMIN — Medication 1 TABLET(S): at 11:02

## 2017-01-21 RX ADMIN — Medication 3 MILLILITER(S): at 23:58

## 2017-01-21 RX ADMIN — Medication 1 DROP(S): at 06:10

## 2017-01-21 RX ADMIN — FLUTICASONE PROPIONATE AND SALMETEROL 1 DOSE(S): 50; 250 POWDER ORAL; RESPIRATORY (INHALATION) at 18:14

## 2017-01-21 RX ADMIN — Medication 0.25 MILLIGRAM(S): at 19:13

## 2017-01-21 RX ADMIN — Medication 25 MILLIGRAM(S): at 21:34

## 2017-01-21 RX ADMIN — Medication 3 MILLILITER(S): at 01:25

## 2017-01-21 RX ADMIN — Medication 0.25 MILLIGRAM(S): at 06:14

## 2017-01-21 RX ADMIN — POLYETHYLENE GLYCOL 3350 17 GRAM(S): 17 POWDER, FOR SOLUTION ORAL at 11:00

## 2017-01-21 RX ADMIN — GABAPENTIN 300 MILLIGRAM(S): 400 CAPSULE ORAL at 13:50

## 2017-01-21 RX ADMIN — OXYCODONE HYDROCHLORIDE 5 MILLIGRAM(S): 5 TABLET ORAL at 18:19

## 2017-01-22 LAB
ANION GAP SERPL CALC-SCNC: 3 MMOL/L — LOW (ref 5–17)
BASOPHILS # BLD AUTO: 0 K/UL — SIGNIFICANT CHANGE UP (ref 0–0.2)
BASOPHILS NFR BLD AUTO: 0.5 % — SIGNIFICANT CHANGE UP (ref 0–2)
BUN SERPL-MCNC: 16 MG/DL — SIGNIFICANT CHANGE UP (ref 7–23)
CALCIUM SERPL-MCNC: 9.1 MG/DL — SIGNIFICANT CHANGE UP (ref 8.4–10.5)
CHLORIDE SERPL-SCNC: 102 MMOL/L — SIGNIFICANT CHANGE UP (ref 96–108)
CO2 SERPL-SCNC: 40 MMOL/L — HIGH (ref 22–31)
CREAT SERPL-MCNC: 0.7 MG/DL — SIGNIFICANT CHANGE UP (ref 0.5–1.3)
EOSINOPHIL # BLD AUTO: 0.5 K/UL — SIGNIFICANT CHANGE UP (ref 0–0.5)
EOSINOPHIL NFR BLD AUTO: 6.1 % — HIGH (ref 0–6)
GLUCOSE SERPL-MCNC: 98 MG/DL — SIGNIFICANT CHANGE UP (ref 70–99)
HCT VFR BLD CALC: 37.5 % — SIGNIFICANT CHANGE UP (ref 34.5–45)
HGB BLD-MCNC: 11.9 G/DL — SIGNIFICANT CHANGE UP (ref 11.5–15.5)
LYMPHOCYTES # BLD AUTO: 1.5 K/UL — SIGNIFICANT CHANGE UP (ref 1–3.3)
LYMPHOCYTES # BLD AUTO: 17.6 % — SIGNIFICANT CHANGE UP (ref 13–44)
MCHC RBC-ENTMCNC: 30.2 PG — SIGNIFICANT CHANGE UP (ref 27–34)
MCHC RBC-ENTMCNC: 31.7 GM/DL — LOW (ref 32–36)
MCV RBC AUTO: 95.2 FL — SIGNIFICANT CHANGE UP (ref 80–100)
MONOCYTES # BLD AUTO: 0.6 K/UL — SIGNIFICANT CHANGE UP (ref 0–0.9)
MONOCYTES NFR BLD AUTO: 7.3 % — SIGNIFICANT CHANGE UP (ref 2–14)
NEUTROPHILS # BLD AUTO: 5.7 K/UL — SIGNIFICANT CHANGE UP (ref 1.8–7.4)
NEUTROPHILS NFR BLD AUTO: 68.5 % — SIGNIFICANT CHANGE UP (ref 43–77)
PLATELET # BLD AUTO: 237 K/UL — SIGNIFICANT CHANGE UP (ref 150–400)
POTASSIUM SERPL-MCNC: 3.6 MMOL/L — SIGNIFICANT CHANGE UP (ref 3.5–5.3)
POTASSIUM SERPL-SCNC: 3.6 MMOL/L — SIGNIFICANT CHANGE UP (ref 3.5–5.3)
RBC # BLD: 3.94 M/UL — SIGNIFICANT CHANGE UP (ref 3.8–5.2)
RBC # FLD: 13.4 % — SIGNIFICANT CHANGE UP (ref 10.3–14.5)
SODIUM SERPL-SCNC: 145 MMOL/L — SIGNIFICANT CHANGE UP (ref 135–145)
WBC # BLD: 8.3 K/UL — SIGNIFICANT CHANGE UP (ref 3.8–10.5)
WBC # FLD AUTO: 8.3 K/UL — SIGNIFICANT CHANGE UP (ref 3.8–10.5)

## 2017-01-22 RX ADMIN — Medication 1 SPRAY(S): at 12:38

## 2017-01-22 RX ADMIN — FLUTICASONE PROPIONATE AND SALMETEROL 1 DOSE(S): 50; 250 POWDER ORAL; RESPIRATORY (INHALATION) at 17:34

## 2017-01-22 RX ADMIN — Medication 3 MILLILITER(S): at 14:21

## 2017-01-22 RX ADMIN — GABAPENTIN 300 MILLIGRAM(S): 400 CAPSULE ORAL at 06:25

## 2017-01-22 RX ADMIN — GABAPENTIN 300 MILLIGRAM(S): 400 CAPSULE ORAL at 14:40

## 2017-01-22 RX ADMIN — Medication 25 MILLIGRAM(S): at 21:23

## 2017-01-22 RX ADMIN — ENOXAPARIN SODIUM 40 MILLIGRAM(S): 100 INJECTION SUBCUTANEOUS at 12:36

## 2017-01-22 RX ADMIN — MONTELUKAST 10 MILLIGRAM(S): 4 TABLET, CHEWABLE ORAL at 12:38

## 2017-01-22 RX ADMIN — Medication 0.25 MILLIGRAM(S): at 08:07

## 2017-01-22 RX ADMIN — Medication 3 MILLILITER(S): at 20:14

## 2017-01-22 RX ADMIN — FAMOTIDINE 40 MILLIGRAM(S): 10 INJECTION INTRAVENOUS at 12:36

## 2017-01-22 RX ADMIN — FLUTICASONE PROPIONATE AND SALMETEROL 1 DOSE(S): 50; 250 POWDER ORAL; RESPIRATORY (INHALATION) at 06:25

## 2017-01-22 RX ADMIN — Medication 1 DROP(S): at 17:34

## 2017-01-22 RX ADMIN — Medication 0.25 MILLIGRAM(S): at 21:23

## 2017-01-22 RX ADMIN — GABAPENTIN 300 MILLIGRAM(S): 400 CAPSULE ORAL at 21:23

## 2017-01-22 RX ADMIN — Medication 3 MILLILITER(S): at 07:31

## 2017-01-22 RX ADMIN — Medication 1 TABLET(S): at 12:36

## 2017-01-22 RX ADMIN — Medication 50 MILLIGRAM(S): at 06:25

## 2017-01-22 RX ADMIN — Medication 1 DROP(S): at 06:25

## 2017-01-23 VITALS
RESPIRATION RATE: 18 BRPM | OXYGEN SATURATION: 97 % | TEMPERATURE: 98 F | HEART RATE: 90 BPM | SYSTOLIC BLOOD PRESSURE: 117 MMHG | DIASTOLIC BLOOD PRESSURE: 70 MMHG

## 2017-01-23 PROCEDURE — 96375 TX/PRO/DX INJ NEW DRUG ADDON: CPT

## 2017-01-23 PROCEDURE — 97116 GAIT TRAINING THERAPY: CPT

## 2017-01-23 PROCEDURE — 87640 STAPH A DNA AMP PROBE: CPT

## 2017-01-23 PROCEDURE — 80202 ASSAY OF VANCOMYCIN: CPT

## 2017-01-23 PROCEDURE — 87486 CHLMYD PNEUM DNA AMP PROBE: CPT

## 2017-01-23 PROCEDURE — 93005 ELECTROCARDIOGRAM TRACING: CPT

## 2017-01-23 PROCEDURE — 85027 COMPLETE CBC AUTOMATED: CPT

## 2017-01-23 PROCEDURE — 36415 COLL VENOUS BLD VENIPUNCTURE: CPT

## 2017-01-23 PROCEDURE — 84145 PROCALCITONIN (PCT): CPT

## 2017-01-23 PROCEDURE — 87581 M.PNEUMON DNA AMP PROBE: CPT

## 2017-01-23 PROCEDURE — 87040 BLOOD CULTURE FOR BACTERIA: CPT

## 2017-01-23 PROCEDURE — 81001 URINALYSIS AUTO W/SCOPE: CPT

## 2017-01-23 PROCEDURE — 87400 INFLUENZA A/B EACH AG IA: CPT

## 2017-01-23 PROCEDURE — 80053 COMPREHEN METABOLIC PANEL: CPT

## 2017-01-23 PROCEDURE — 85730 THROMBOPLASTIN TIME PARTIAL: CPT

## 2017-01-23 PROCEDURE — 97161 PT EVAL LOW COMPLEX 20 MIN: CPT

## 2017-01-23 PROCEDURE — 71045 X-RAY EXAM CHEST 1 VIEW: CPT

## 2017-01-23 PROCEDURE — 87633 RESP VIRUS 12-25 TARGETS: CPT

## 2017-01-23 PROCEDURE — 96374 THER/PROPH/DIAG INJ IV PUSH: CPT

## 2017-01-23 PROCEDURE — 83605 ASSAY OF LACTIC ACID: CPT

## 2017-01-23 PROCEDURE — 87798 DETECT AGENT NOS DNA AMP: CPT

## 2017-01-23 PROCEDURE — 99285 EMERGENCY DEPT VISIT HI MDM: CPT

## 2017-01-23 PROCEDURE — 94640 AIRWAY INHALATION TREATMENT: CPT

## 2017-01-23 PROCEDURE — 83735 ASSAY OF MAGNESIUM: CPT

## 2017-01-23 PROCEDURE — 97530 THERAPEUTIC ACTIVITIES: CPT

## 2017-01-23 PROCEDURE — 87641 MR-STAPH DNA AMP PROBE: CPT

## 2017-01-23 PROCEDURE — 82803 BLOOD GASES ANY COMBINATION: CPT

## 2017-01-23 PROCEDURE — 36600 WITHDRAWAL OF ARTERIAL BLOOD: CPT

## 2017-01-23 PROCEDURE — 80048 BASIC METABOLIC PNL TOTAL CA: CPT

## 2017-01-23 PROCEDURE — 97001: CPT

## 2017-01-23 PROCEDURE — 85610 PROTHROMBIN TIME: CPT

## 2017-01-23 PROCEDURE — 87086 URINE CULTURE/COLONY COUNT: CPT

## 2017-01-23 PROCEDURE — 94660 CPAP INITIATION&MGMT: CPT

## 2017-01-23 RX ORDER — IPRATROPIUM/ALBUTEROL SULFATE 18-103MCG
3 AEROSOL WITH ADAPTER (GRAM) INHALATION
Qty: 0 | Refills: 0 | DISCHARGE
Start: 2017-01-23

## 2017-01-23 RX ADMIN — FAMOTIDINE 40 MILLIGRAM(S): 10 INJECTION INTRAVENOUS at 11:51

## 2017-01-23 RX ADMIN — GABAPENTIN 300 MILLIGRAM(S): 400 CAPSULE ORAL at 14:17

## 2017-01-23 RX ADMIN — FLUTICASONE PROPIONATE AND SALMETEROL 1 DOSE(S): 50; 250 POWDER ORAL; RESPIRATORY (INHALATION) at 05:30

## 2017-01-23 RX ADMIN — Medication 3 MILLILITER(S): at 07:49

## 2017-01-23 RX ADMIN — ENOXAPARIN SODIUM 40 MILLIGRAM(S): 100 INJECTION SUBCUTANEOUS at 11:51

## 2017-01-23 RX ADMIN — Medication 3 MILLILITER(S): at 12:25

## 2017-01-23 RX ADMIN — OXYCODONE HYDROCHLORIDE 5 MILLIGRAM(S): 5 TABLET ORAL at 09:27

## 2017-01-23 RX ADMIN — GABAPENTIN 300 MILLIGRAM(S): 400 CAPSULE ORAL at 05:30

## 2017-01-23 RX ADMIN — OXYCODONE HYDROCHLORIDE 5 MILLIGRAM(S): 5 TABLET ORAL at 10:00

## 2017-01-23 RX ADMIN — Medication 1 SPRAY(S): at 11:53

## 2017-01-23 RX ADMIN — Medication 50 MILLIGRAM(S): at 05:30

## 2017-01-23 RX ADMIN — Medication 3 MILLILITER(S): at 01:02

## 2017-01-23 RX ADMIN — Medication 1 DROP(S): at 05:30

## 2017-01-23 RX ADMIN — Medication 1 TABLET(S): at 11:51

## 2017-01-23 RX ADMIN — MONTELUKAST 10 MILLIGRAM(S): 4 TABLET, CHEWABLE ORAL at 11:53

## 2017-01-23 RX ADMIN — Medication 0.25 MILLIGRAM(S): at 16:07

## 2017-01-23 NOTE — DISCHARGE NOTE ADULT - SECONDARY DIAGNOSIS.
Acute on chronic respiratory failure with hypoxia and hypercapnia Chronic obstructive pulmonary disease, unspecified COPD type Pneumonia of left lower lobe due to infectious organism

## 2017-01-23 NOTE — DISCHARGE NOTE ADULT - CARE PROVIDER_API CALL
Brando Larose), Internal Medicine  [address] [place] [address]  Phone: [phone]  Fax: [phone]    Teri Cade), Internal Medicine  [address] [place] [address]  Phone: [phone]  Fax: [phone]

## 2017-01-23 NOTE — DISCHARGE NOTE ADULT - HOSPITAL COURSE
This is a 70 YO W F transferred from SNF because of SOB. According to son he noticed for last few days that her breathing was not normal and possibly she was not getting her nebulizer treatment. She had  fever and she was coughing.   Patient treated with IV antibiotics and BiPAP vent.  Her condition improved and she recovered to her base line health and discharged to SNF.

## 2017-01-23 NOTE — DISCHARGE NOTE ADULT - MEDICATION SUMMARY - MEDICATIONS TO TAKE
I will START or STAY ON the medications listed below when I get home from the hospital:    predniSONE 50 mg oral tablet  -- 1 tab(s) by mouth once a day, taper down 10 mg every 5 days to 10 mg daily  -- Indication: For COPD    oxyCODONE 5 mg oral tablet  -- 1 tab(s) by mouth every 6 hours  -- Indication: For Pain     Tylenol 325 mg oral tablet  -- 2 tab(s) by mouth every 4 hours  -- Indication: For Pain & Fever    gabapentin 300 mg oral capsule  -- 1 cap(s) by mouth 3 times a day  -- Indication: For Peripheral neuropathy    traZODone  -- 25 milligram(s) by mouth once a day (at bedtime)  -- Indication: For Depression    Xanax 0.25 mg oral tablet  --  by mouth once a day  -- Indication: For Anxiety    Fosamax 70 mg oral tablet  -- 1 tab(s) by mouth once a week  -- Indication: For Osteoporosis    Combivent 18 mcg-103 mcg-/inh inhalation aerosol  --  inhaled 4 times a day  -- Indication: For Chronic obstructive pulmonary disease, unspecified COPD type    Symbicort 160 mcg-4.5 mcg/inh inhalation aerosol  -- 2 puff(s) inhaled 2 times a day  -- Indication: For Chronic obstructive pulmonary disease, unspecified COPD type    albuterol-ipratropium 2.5 mg-0.5 mg/3 mL inhalation solution  -- 3 milliliter(s) inhaled every 6 hours  -- Indication: For Chronic obstructive pulmonary disease, unspecified COPD type    Pepcid 40 mg oral tablet  -- 1 tab(s) by mouth once a day (at bedtime)  -- Indication: For GERD    Iron 100 Plus  --  by mouth   -- Indication: For Anemia    MiraLax oral powder for reconstitution  --  by mouth   -- Indication: For Constipation    Singulair 10 mg oral tablet  -- 1 tab(s) by mouth once a day  -- Indication: For Chronic obstructive pulmonary disease, unspecified COPD type    Flonase 50 mcg/inh nasal spray  -- 1 spray(s) into nose once a day  -- Indication: For Allergic Rhinitis    Artificial Tears ophthalmic solution  -- 1 drop(s) to each affected eye 2 times a day  -- Indication: For Dry eyes    Os-Sathish 250 with D  --   2 times a day  -- Indication: For Osteoporosis

## 2017-01-23 NOTE — DISCHARGE NOTE ADULT - CARE PLAN
Principal Discharge DX:	Pneumonia due to influenza  Goal:	Treated with IV antibiotics  Instructions for follow-up, activity and diet:	Resolved and d/c to SNF  Secondary Diagnosis:	Acute on chronic respiratory failure with hypoxia and hypercapnia  Secondary Diagnosis:	Chronic obstructive pulmonary disease, unspecified COPD type  Secondary Diagnosis:	Pneumonia of left lower lobe due to infectious organism

## 2017-01-23 NOTE — DISCHARGE NOTE ADULT - PATIENT PORTAL LINK FT
“You can access the Follow[facility] Patient Portal, offered by [facility], by registering with the following website: [email or web address]”

## 2017-05-01 NOTE — PHYSICAL THERAPY INITIAL EVALUATION ADULT - THERAPY FREQUENCY, PT EVAL
Patient informed she will have to request testing for celiac disease either through GI or PCP. This is not a neurological disorder. Patient verbalizes understanding.
3-5x/week

## 2017-06-10 ENCOUNTER — INPATIENT (INPATIENT)
Facility: HOSPITAL | Age: 72
LOS: 8 days | Discharge: NURSING, REHAB OR LONG-TERM CARE | DRG: 189 | End: 2017-06-19
Attending: INTERNAL MEDICINE | Admitting: INTERNAL MEDICINE
Payer: MEDICARE

## 2017-06-10 VITALS
WEIGHT: 130.07 LBS | OXYGEN SATURATION: 100 % | HEART RATE: 100 BPM | SYSTOLIC BLOOD PRESSURE: 172 MMHG | RESPIRATION RATE: 30 BRPM | TEMPERATURE: 100 F | DIASTOLIC BLOOD PRESSURE: 70 MMHG | HEIGHT: 60 IN

## 2017-06-10 DIAGNOSIS — J96.02 ACUTE RESPIRATORY FAILURE WITH HYPERCAPNIA: ICD-10-CM

## 2017-06-10 DIAGNOSIS — F32.9 MAJOR DEPRESSIVE DISORDER, SINGLE EPISODE, UNSPECIFIED: ICD-10-CM

## 2017-06-10 DIAGNOSIS — K21.9 GASTRO-ESOPHAGEAL REFLUX DISEASE WITHOUT ESOPHAGITIS: ICD-10-CM

## 2017-06-10 DIAGNOSIS — Z29.9 ENCOUNTER FOR PROPHYLACTIC MEASURES, UNSPECIFIED: ICD-10-CM

## 2017-06-10 DIAGNOSIS — R06.89 OTHER ABNORMALITIES OF BREATHING: ICD-10-CM

## 2017-06-10 DIAGNOSIS — M81.0 AGE-RELATED OSTEOPOROSIS WITHOUT CURRENT PATHOLOGICAL FRACTURE: ICD-10-CM

## 2017-06-10 DIAGNOSIS — F41.9 ANXIETY DISORDER, UNSPECIFIED: ICD-10-CM

## 2017-06-10 DIAGNOSIS — J44.1 CHRONIC OBSTRUCTIVE PULMONARY DISEASE WITH (ACUTE) EXACERBATION: ICD-10-CM

## 2017-06-10 DIAGNOSIS — J96.22 ACUTE AND CHRONIC RESPIRATORY FAILURE WITH HYPERCAPNIA: ICD-10-CM

## 2017-06-10 DIAGNOSIS — J96.00 ACUTE RESPIRATORY FAILURE, UNSPECIFIED WHETHER WITH HYPOXIA OR HYPERCAPNIA: ICD-10-CM

## 2017-06-10 LAB
ALBUMIN SERPL ELPH-MCNC: 3.7 G/DL — SIGNIFICANT CHANGE UP (ref 3.3–5)
ALP SERPL-CCNC: 53 U/L — SIGNIFICANT CHANGE UP (ref 30–120)
ALT FLD-CCNC: 17 U/L DA — SIGNIFICANT CHANGE UP (ref 10–60)
ANION GAP SERPL CALC-SCNC: 2 MMOL/L — LOW (ref 5–17)
APPEARANCE UR: CLEAR — SIGNIFICANT CHANGE UP
APTT BLD: 32.4 SEC — SIGNIFICANT CHANGE UP (ref 27.5–37.4)
AST SERPL-CCNC: 19 U/L — SIGNIFICANT CHANGE UP (ref 10–40)
BASE EXCESS BLDA CALC-SCNC: 1.6 MMOL/L — SIGNIFICANT CHANGE UP (ref -2–2)
BASE EXCESS BLDA CALC-SCNC: 2.1 MMOL/L — HIGH (ref -2–2)
BASOPHILS # BLD AUTO: 0.1 K/UL — SIGNIFICANT CHANGE UP (ref 0–0.2)
BASOPHILS NFR BLD AUTO: 0.9 % — SIGNIFICANT CHANGE UP (ref 0–2)
BILIRUB SERPL-MCNC: 0.6 MG/DL — SIGNIFICANT CHANGE UP (ref 0.2–1.2)
BILIRUB UR-MCNC: NEGATIVE — SIGNIFICANT CHANGE UP
BLOOD GAS SOURCE: SIGNIFICANT CHANGE UP
BLOOD GAS SOURCE: SIGNIFICANT CHANGE UP
BUN SERPL-MCNC: 18 MG/DL — SIGNIFICANT CHANGE UP (ref 7–23)
CALCIUM SERPL-MCNC: 9 MG/DL — SIGNIFICANT CHANGE UP (ref 8.4–10.5)
CHLORIDE SERPL-SCNC: 100 MMOL/L — SIGNIFICANT CHANGE UP (ref 96–108)
CK MB BLD-MCNC: 3.6 % — HIGH (ref 0–3.5)
CK MB CFR SERPL CALC: 3.7 NG/ML — HIGH (ref 0–3.6)
CK SERPL-CCNC: 104 U/L — SIGNIFICANT CHANGE UP (ref 26–192)
CO2 SERPL-SCNC: 37 MMOL/L — HIGH (ref 22–31)
COLOR SPEC: YELLOW — SIGNIFICANT CHANGE UP
CREAT SERPL-MCNC: 0.68 MG/DL — SIGNIFICANT CHANGE UP (ref 0.5–1.3)
DIFF PNL FLD: ABNORMAL
EOSINOPHIL # BLD AUTO: 0.2 K/UL — SIGNIFICANT CHANGE UP (ref 0–0.5)
EOSINOPHIL NFR BLD AUTO: 2.6 % — SIGNIFICANT CHANGE UP (ref 0–6)
GLUCOSE SERPL-MCNC: 134 MG/DL — HIGH (ref 70–99)
GLUCOSE UR QL: NEGATIVE MG/DL — SIGNIFICANT CHANGE UP
HCO3 BLDA-SCNC: 25 MMOL/L — SIGNIFICANT CHANGE UP (ref 21–29)
HCO3 BLDA-SCNC: 25 MMOL/L — SIGNIFICANT CHANGE UP (ref 21–29)
HCT VFR BLD CALC: 38.6 % — SIGNIFICANT CHANGE UP (ref 34.5–45)
HGB BLD-MCNC: 13.4 G/DL — SIGNIFICANT CHANGE UP (ref 11.5–15.5)
HOROWITZ INDEX BLDA+IHG-RTO: 1 — SIGNIFICANT CHANGE UP
HOROWITZ INDEX BLDA+IHG-RTO: 40 — SIGNIFICANT CHANGE UP
INR BLD: 0.96 RATIO — SIGNIFICANT CHANGE UP (ref 0.88–1.16)
KETONES UR-MCNC: NEGATIVE — SIGNIFICANT CHANGE UP
LACTATE SERPL-SCNC: 0.4 MMOL/L — LOW (ref 0.7–2)
LEUKOCYTE ESTERASE UR-ACNC: ABNORMAL
LYMPHOCYTES # BLD AUTO: 0.7 K/UL — LOW (ref 1–3.3)
LYMPHOCYTES # BLD AUTO: 8.4 % — LOW (ref 13–44)
MCHC RBC-ENTMCNC: 33.1 PG — SIGNIFICANT CHANGE UP (ref 27–34)
MCHC RBC-ENTMCNC: 34.8 GM/DL — SIGNIFICANT CHANGE UP (ref 32–36)
MCV RBC AUTO: 95 FL — SIGNIFICANT CHANGE UP (ref 80–100)
MONOCYTES # BLD AUTO: 0.8 K/UL — SIGNIFICANT CHANGE UP (ref 0–0.9)
MONOCYTES NFR BLD AUTO: 9 % — SIGNIFICANT CHANGE UP (ref 2–14)
NEUTROPHILS # BLD AUTO: 7 K/UL — SIGNIFICANT CHANGE UP (ref 1.8–7.4)
NEUTROPHILS NFR BLD AUTO: 79.1 % — HIGH (ref 43–77)
NITRITE UR-MCNC: NEGATIVE — SIGNIFICANT CHANGE UP
PCO2 BLDA: 59 MMHG — HIGH (ref 32–46)
PCO2 BLDA: 71 MMHG — CRITICAL HIGH (ref 32–46)
PH BLD: 7.23 — LOW (ref 7.35–7.45)
PH BLD: 7.29 — LOW (ref 7.35–7.45)
PH UR: 6 — SIGNIFICANT CHANGE UP (ref 5–8)
PLATELET # BLD AUTO: 236 K/UL — SIGNIFICANT CHANGE UP (ref 150–400)
PO2 BLDA: 133 MMHG — HIGH (ref 74–108)
PO2 BLDA: 524 MMHG — HIGH (ref 74–108)
POTASSIUM SERPL-MCNC: 4.3 MMOL/L — SIGNIFICANT CHANGE UP (ref 3.5–5.3)
POTASSIUM SERPL-SCNC: 4.3 MMOL/L — SIGNIFICANT CHANGE UP (ref 3.5–5.3)
PROCALCITONIN SERPL-MCNC: <0.05 NG/ML — SIGNIFICANT CHANGE UP (ref 0–0.04)
PROT SERPL-MCNC: 7.1 G/DL — SIGNIFICANT CHANGE UP (ref 6–8.3)
PROT UR-MCNC: 15 MG/DL
PROTHROM AB SERPL-ACNC: 10.5 SEC — SIGNIFICANT CHANGE UP (ref 9.8–12.7)
RBC # BLD: 4.06 M/UL — SIGNIFICANT CHANGE UP (ref 3.8–5.2)
RBC # FLD: 13.3 % — SIGNIFICANT CHANGE UP (ref 10.3–14.5)
SAO2 % BLDA: 100 % — HIGH (ref 92–96)
SAO2 % BLDA: 99 % — HIGH (ref 92–96)
SODIUM SERPL-SCNC: 139 MMOL/L — SIGNIFICANT CHANGE UP (ref 135–145)
SP GR SPEC: 1.02 — SIGNIFICANT CHANGE UP (ref 1.01–1.02)
TROPONIN I SERPL-MCNC: 0 NG/ML — LOW (ref 0.02–0.06)
UROBILINOGEN FLD QL: NEGATIVE MG/DL — SIGNIFICANT CHANGE UP
WBC # BLD: 8.9 K/UL — SIGNIFICANT CHANGE UP (ref 3.8–10.5)
WBC # FLD AUTO: 8.9 K/UL — SIGNIFICANT CHANGE UP (ref 3.8–10.5)

## 2017-06-10 PROCEDURE — 93010 ELECTROCARDIOGRAM REPORT: CPT

## 2017-06-10 PROCEDURE — 71010: CPT | Mod: 26

## 2017-06-10 PROCEDURE — 93306 TTE W/DOPPLER COMPLETE: CPT | Mod: 26

## 2017-06-10 PROCEDURE — 99285 EMERGENCY DEPT VISIT HI MDM: CPT

## 2017-06-10 RX ORDER — BUDESONIDE AND FORMOTEROL FUMARATE DIHYDRATE 160; 4.5 UG/1; UG/1
1 AEROSOL RESPIRATORY (INHALATION)
Refills: 0 | Status: DISCONTINUED | OUTPATIENT
Start: 2017-06-10 | End: 2017-06-17

## 2017-06-10 RX ORDER — TIOTROPIUM BROMIDE 18 UG/1
1 CAPSULE ORAL; RESPIRATORY (INHALATION) DAILY
Refills: 0 | Status: DISCONTINUED | OUTPATIENT
Start: 2017-06-10 | End: 2017-06-19

## 2017-06-10 RX ORDER — FLUTICASONE PROPIONATE 50 MCG
1 SPRAY, SUSPENSION NASAL DAILY
Refills: 0 | Status: DISCONTINUED | OUTPATIENT
Start: 2017-06-10 | End: 2017-06-19

## 2017-06-10 RX ORDER — ACETAMINOPHEN 500 MG
650 TABLET ORAL EVERY 6 HOURS
Refills: 0 | Status: DISCONTINUED | OUTPATIENT
Start: 2017-06-10 | End: 2017-06-19

## 2017-06-10 RX ORDER — SODIUM CHLORIDE 9 MG/ML
800 INJECTION INTRAMUSCULAR; INTRAVENOUS; SUBCUTANEOUS ONCE
Refills: 0 | Status: COMPLETED | OUTPATIENT
Start: 2017-06-10 | End: 2017-06-10

## 2017-06-10 RX ORDER — POLYETHYLENE GLYCOL 3350 17 G/17G
17 POWDER, FOR SOLUTION ORAL DAILY
Refills: 0 | Status: DISCONTINUED | OUTPATIENT
Start: 2017-06-10 | End: 2017-06-19

## 2017-06-10 RX ORDER — SODIUM CHLORIDE 9 MG/ML
1000 INJECTION INTRAMUSCULAR; INTRAVENOUS; SUBCUTANEOUS
Refills: 0 | Status: DISCONTINUED | OUTPATIENT
Start: 2017-06-10 | End: 2017-06-13

## 2017-06-10 RX ORDER — ALPRAZOLAM 0.25 MG
0.25 TABLET ORAL DAILY
Refills: 0 | Status: DISCONTINUED | OUTPATIENT
Start: 2017-06-10 | End: 2017-06-12

## 2017-06-10 RX ORDER — SODIUM CHLORIDE 9 MG/ML
1000 INJECTION INTRAMUSCULAR; INTRAVENOUS; SUBCUTANEOUS ONCE
Refills: 0 | Status: COMPLETED | OUTPATIENT
Start: 2017-06-10 | End: 2017-06-10

## 2017-06-10 RX ORDER — IPRATROPIUM/ALBUTEROL SULFATE 18-103MCG
3 AEROSOL WITH ADAPTER (GRAM) INHALATION EVERY 6 HOURS
Refills: 0 | Status: DISCONTINUED | OUTPATIENT
Start: 2017-06-10 | End: 2017-06-19

## 2017-06-10 RX ORDER — GABAPENTIN 400 MG/1
300 CAPSULE ORAL THREE TIMES A DAY
Refills: 0 | Status: DISCONTINUED | OUTPATIENT
Start: 2017-06-10 | End: 2017-06-19

## 2017-06-10 RX ORDER — OXYCODONE HYDROCHLORIDE 5 MG/1
5 TABLET ORAL EVERY 6 HOURS
Refills: 0 | Status: DISCONTINUED | OUTPATIENT
Start: 2017-06-10 | End: 2017-06-17

## 2017-06-10 RX ORDER — MONTELUKAST 4 MG/1
10 TABLET, CHEWABLE ORAL DAILY
Refills: 0 | Status: DISCONTINUED | OUTPATIENT
Start: 2017-06-10 | End: 2017-06-19

## 2017-06-10 RX ORDER — FERROUS SULFATE 325(65) MG
325 TABLET ORAL DAILY
Refills: 0 | Status: DISCONTINUED | OUTPATIENT
Start: 2017-06-10 | End: 2017-06-19

## 2017-06-10 RX ORDER — FAMOTIDINE 10 MG/ML
20 INJECTION INTRAVENOUS EVERY 12 HOURS
Refills: 0 | Status: DISCONTINUED | OUTPATIENT
Start: 2017-06-10 | End: 2017-06-13

## 2017-06-10 RX ORDER — HEPARIN SODIUM 5000 [USP'U]/ML
5000 INJECTION INTRAVENOUS; SUBCUTANEOUS EVERY 12 HOURS
Refills: 0 | Status: DISCONTINUED | OUTPATIENT
Start: 2017-06-10 | End: 2017-06-19

## 2017-06-10 RX ADMIN — GABAPENTIN 300 MILLIGRAM(S): 400 CAPSULE ORAL at 22:45

## 2017-06-10 RX ADMIN — HEPARIN SODIUM 5000 UNIT(S): 5000 INJECTION INTRAVENOUS; SUBCUTANEOUS at 17:07

## 2017-06-10 RX ADMIN — SODIUM CHLORIDE 800 MILLILITER(S): 9 INJECTION INTRAMUSCULAR; INTRAVENOUS; SUBCUTANEOUS at 02:16

## 2017-06-10 RX ADMIN — TIOTROPIUM BROMIDE 1 CAPSULE(S): 18 CAPSULE ORAL; RESPIRATORY (INHALATION) at 08:34

## 2017-06-10 RX ADMIN — Medication 1 MILLIGRAM(S): at 03:15

## 2017-06-10 RX ADMIN — POLYETHYLENE GLYCOL 3350 17 GRAM(S): 17 POWDER, FOR SOLUTION ORAL at 11:17

## 2017-06-10 RX ADMIN — MONTELUKAST 10 MILLIGRAM(S): 4 TABLET, CHEWABLE ORAL at 11:18

## 2017-06-10 RX ADMIN — Medication 40 MILLIGRAM(S): at 22:43

## 2017-06-10 RX ADMIN — Medication 3 MILLILITER(S): at 20:46

## 2017-06-10 RX ADMIN — Medication 1 DROP(S): at 13:03

## 2017-06-10 RX ADMIN — SODIUM CHLORIDE 1000 MILLILITER(S): 9 INJECTION INTRAMUSCULAR; INTRAVENOUS; SUBCUTANEOUS at 03:11

## 2017-06-10 RX ADMIN — Medication 325 MILLIGRAM(S): at 11:17

## 2017-06-10 RX ADMIN — Medication 1 SPRAY(S): at 11:18

## 2017-06-10 RX ADMIN — Medication 1 TABLET(S): at 17:07

## 2017-06-10 RX ADMIN — Medication 40 MILLIGRAM(S): at 13:03

## 2017-06-10 RX ADMIN — SODIUM CHLORIDE 75 MILLILITER(S): 9 INJECTION INTRAMUSCULAR; INTRAVENOUS; SUBCUTANEOUS at 11:17

## 2017-06-10 RX ADMIN — Medication 3 MILLILITER(S): at 08:01

## 2017-06-10 RX ADMIN — GABAPENTIN 300 MILLIGRAM(S): 400 CAPSULE ORAL at 13:03

## 2017-06-10 RX ADMIN — Medication 0.25 MILLIGRAM(S): at 11:17

## 2017-06-10 RX ADMIN — FAMOTIDINE 20 MILLIGRAM(S): 10 INJECTION INTRAVENOUS at 17:07

## 2017-06-10 RX ADMIN — Medication 1 DROP(S): at 22:44

## 2017-06-10 RX ADMIN — Medication 3 MILLILITER(S): at 14:00

## 2017-06-10 RX ADMIN — BUDESONIDE AND FORMOTEROL FUMARATE DIHYDRATE 1 PUFF(S): 160; 4.5 AEROSOL RESPIRATORY (INHALATION) at 08:34

## 2017-06-10 NOTE — PROGRESS NOTE ADULT - SUBJECTIVE AND OBJECTIVE BOX
INTERVAL HPI/OVERNIGHT EVENTS/SUBJECTIVE:  HPI:  71 years old female with history of COPD, Anemia, history of respiratory failure, anxiety, who was at [facility]. Patient was having increasing shortness of breath. She was sent in to ER for same. Patient was in respiratory distress and was placed  on BIPAP (10 Rashawn 2017 14:32). ABG with hypercarbic respiratory failure and patient admitted to ICU for further management. Patient tolerating BIPAP well at the moment. Able to make her needs known and with some complaint of dyspnea.     ICU Vital Signs Last 24 Hrs  T(C): 36.3, Max: 37.8 (06-10 @ 05:58)  T(F): 97.4, Max: 100 (06-10 @ 05:58)  HR: 102 (95 - 107)  BP: 164/75 (118/60 - 179/101)  BP(mean): --  ABP: --  ABP(mean): --  RR: 20 (20 - 30)  SpO2: 98% (95% - 100%)      I&O's Detail    I & Os for current day (as of 10 Rashawn 2017 21:56)  =============================================  IN:    sodium chloride 0.9%.: 750 ml    Total IN: 750 ml  ---------------------------------------------  OUT:    Voided: 800 ml    Total OUT: 800 ml  ---------------------------------------------  Total NET: -50 ml        ABG - ( 10 Rashawn 2017 14:03 )  pH: x     /  pCO2: 59    /  pO2: 133   / HCO3: 25    / Base Excess: 1.6   /  SaO2: 99          MEDICATIONS  (STANDING):  ALBUTerol/ipratropium for Nebulization 3milliLiter(s) Nebulizer every 6 hours  buDESOnide 160 MICROgram(s)/formoterol 4.5 MICROgram(s) Inhaler 1Puff(s) Inhalation two times a day  tiotropium 18 MICROgram(s) Capsule 1Capsule(s) Inhalation daily  methylPREDNISolone sodium succinate Injectable 40milliGRAM(s) IV Push every 8 hours  levoFLOXacin IVPB 500milliGRAM(s) IV Intermittent every 24 hours  heparin  Injectable 5000Unit(s) SubCutaneous every 12 hours  sodium chloride 0.9%. 1000milliLiter(s) IV Continuous <Continuous>  gabapentin 300milliGRAM(s) Oral three times a day  ALPRAZolam 0.25milliGRAM(s) Oral daily  famotidine Injectable 20milliGRAM(s) IV Push every 12 hours  ferrous    sulfate 325milliGRAM(s) Oral daily  polyethylene glycol 3350 17Gram(s) Oral daily  montelukast 10milliGRAM(s) Oral daily  fluticasone propionate 50 MICROgram(s)/spray Nasal Spray 1Spray(s) Both Nostrils daily  artificial tears (preservative free) Ophthalmic Solution 1Drop(s) Both EYES three times a day  calcium carbonate  625 mG + Vitamin D (OsCal 250 + D) 1Tablet(s) Oral two times a day    MEDICATIONS  (PRN):  acetaminophen   Tablet. 650milliGRAM(s) Oral every 6 hours PRN Mild Pain (1 - 3)  oxyCODONE IR 5milliGRAM(s) Oral every 6 hours PRN Moderate Pain (4 - 6)      NUTRITION/IVF: Dyspagia diet      PHYSICAL EXAM:    Gen: well developed female in mild distress    Eyes: PERRLA. EOMI    Neurological: Confused. No new focal deficits    Neck: Trachea midline, supple neck    Pulmonary: decreased bs bilateral bases. coarse.    Cardiovascular: NSR    Gastrointestinal: Soft NT ND    Genitourinary: no haynes.able to void    Extremities: no c/c/e.    Skin: intact    LABS:  CBC Full  -  ( 10 Rashawn 2017 02:13 )  WBC Count : 8.9 K/uL  Hemoglobin : 13.4 g/dL  Hematocrit : 38.6 %  Platelet Count - Automated : 236 K/uL  Mean Cell Volume : 95.0 fl  Mean Cell Hemoglobin : 33.1 pg  Mean Cell Hemoglobin Concentration : 34.8 gm/dL  Auto Neutrophil # : 7.0 K/uL  Auto Lymphocyte # : 0.7 K/uL  Auto Monocyte # : 0.8 K/uL  Auto Eosinophil # : 0.2 K/uL  Auto Basophil # : 0.1 K/uL  Auto Neutrophil % : 79.1 %  Auto Lymphocyte % : 8.4 %  Auto Monocyte % : 09.0 %  Auto Eosinophil % : 2.6 %  Auto Basophil % : 0.9 %    06-10    139  |  100  |  18  ----------------------------<  134<H>  4.3   |  37<H>  |  0.68    Ca    9.0      10 Rashawn 2017 02:13    TPro  7.1  /  Alb  3.7  /  TBili  0.6  /  DBili  x   /  AST  19  /  ALT  17  /  AlkPhos  53  06-10    PT/INR - ( 10 Rashawn 2017 02:13 )   PT: 10.5 sec;   INR: 0.96 ratio         PTT - ( 10 Rashawn 2017 02:13 )  PTT:32.4 sec  Urinalysis Basic - ( 10 Rashawn 2017 02:13 )    Color: Yellow / Appearance: Clear / S.020 / pH: x  Gluc: x / Ketone: Negative  / Bili: Negative / Urobili: Negative mg/dL   Blood: x / Protein: 15 mg/dL / Nitrite: Negative   Leuk Esterase: Moderate / RBC: 6-10 /HPF / WBC 11-25   Sq Epi: x / Non Sq Epi: Few / Bacteria: Few      RECENT CULTURES:      LIVER FUNCTIONS - ( 10 Rashawn 2017 02:13 )  Alb: 3.7 g/dL / Pro: 7.1 g/dL / ALK PHOS: 53 U/L / ALT: 17 U/L DA / AST: 19 U/L / GGT: x           CARDIAC MARKERS ( 10 Rashawn 2017 02:13 )  .000 ng/mL / x     / 104 U/L / x     / 3.7 ng/mL      CAPILLARY BLOOD GLUCOSE      RADIOLOGY & ADDITIONAL STUDIES:    ASSESSMENT/PLAN:  71yFemale presenting with:    Neuro: maintain sleep wake/cycle. Avoid deliriogenic meds.     CV: stable    Pulm: Continue with treatment for pna. Will attempt to wean off bipap. Repeat ABG to assess hypercarbic state. Continue nebulizer therapy. steroids    GI/Nutrition: diet once off bipap. Aspiration precautions.     /Renal: strict I&Os    ID: continue with levaquin for CAP    Lines/Tubes:PIV only at present    Skin: routine hospital skin care to avoid breakdown    Proph: lovenox for DVT P    Dispo: continue ICU level care. Wean bipap as able      CRITICAL CARE TIME SPENT: 38 min

## 2017-06-10 NOTE — ED PROVIDER NOTE - QUALITY
alteration in breathing pattern/non-productive cough alteration in breathing pattern/non-productive cough/yellow mucous

## 2017-06-10 NOTE — H&P ADULT - NSHPPHYSICALEXAM_GEN_ALL_CORE
PHYSICAL EXAM:  GENERAL: NAD, well-groomed, well-developed  HEAD:  Atraumatic, Normocephalic  EYES: EOMI, PERRLA, conjunctiva and sclera clear  ENMT: No tonsillar erythema, exudates, or enlargement; Moist mucous membranes, Good dentition, No lesions  NECK: Supple, No JVD, Normal thyroid  CHEST/LUNG: Clear to auscultation bilaterally; No rales, rhonchi, wheezing, or rubs  HEART: Regular rate and rhythm; No murmurs, rubs, or gallops  ABDOMEN: Soft, Nontender, Nondistended; Bowel sounds present  EXTREMITIES:  2+ Peripheral Pulses, No clubbing, cyanosis, or edema  MS: No joint swelling or deformity.   LYMPH: No lymphadenopathy noted  SKIN: No rashes or lesions  NERVOUS SYSTEM:  Motor Strength 4/5 B/L upper and lower extremities; DTRs 2+ intact and symmetric  PSYCH:  Alert & Oriented X3, Good concentration. PHYSICAL EXAM:  GENERAL: On BiPAP, well-groomed, well-developed  HEAD:  Atraumatic, Normocephalic  EYES: EOMI, PERRLA, conjunctiva and sclera clear  ENMT: No tonsillar erythema, exudates, or enlargement; Moist mucous membranes, Good dentition, No lesions  NECK: Supple, No JVD, Normal thyroid  CHEST/LUNG: b/L decreased breath sounds noted.   HEART: Regular rate and rhythm; No murmurs, rubs, or gallops  ABDOMEN: Soft, Nontender, Nondistended; Bowel sounds present  EXTREMITIES:  2+ Peripheral Pulses, No clubbing, cyanosis, or edema  MS: No joint swelling or deformity.   LYMPH: No lymphadenopathy noted  SKIN: No rashes or lesions  NERVOUS SYSTEM:  Motor Strength 4/5 B/L upper and lower extremities; DTRs 2+ intact and symmetric  PSYCH:  Alert & Oriented X3, Good concentration.

## 2017-06-10 NOTE — H&P ADULT - NSHPSOCIALHISTORY_GEN_ALL_CORE
Ex-smoker, smoked 1PPD for 50 years.  No alcohol abuse.    Lives at [facility] since Sep 2016.

## 2017-06-10 NOTE — PATIENT PROFILE ADULT. - PMH
Chronic obstructive pulmonary disease, unspecified COPD type    Depression    GERD (gastroesophageal reflux disease)    Herpes zoster with other complication  Left arm.  Neuropathy

## 2017-06-10 NOTE — H&P ADULT - HISTORY OF PRESENT ILLNESS
71 years old female with history of COPD, Anemia, history of respiratory failure, anxiety, who was at [facility]. Patient was having increasing shortness of breath. She was sent in to ER for same. Patient was in respiratory distress and was placed  on 71 years old female with history of COPD, Anemia, history of respiratory failure, anxiety, who was at [facility]. Patient was having increasing shortness of breath. She was sent in to ER for same. Patient was in respiratory distress and was placed  on BiPAP. She is admitted for further management.   Patient is currently on BiPAP and continues to c/o shortness of breath.   She denies any chest pain or pressure.  No nausea or vomiting.   No fever or chills.   No dizziness or syncope.

## 2017-06-10 NOTE — ED PROVIDER NOTE - OBJECTIVE STATEMENT
72 yo wf from [facility] with sob today.Put on BIPAP by ems prior to her arrival here for decreased o2sat.h/o copd.pt states she has dry cough.. denies cp or fever.

## 2017-06-10 NOTE — ED PROVIDER NOTE - CARE PLAN
Principal Discharge DX:	Respiratory insufficiency Principal Discharge DX:	Respiratory insufficiency  Secondary Diagnosis:	UTI (urinary tract infection)

## 2017-06-10 NOTE — H&P ADULT - NSHPLABSRESULTS_GEN_ALL_CORE
13.4   8.9   )-----------( 236      ( 10 Rashwan 2017 02:13 )             38.6     10 Rashawn 2017 02:13    139    |  100    |  18     ----------------------------<  134    4.3     |  37     |  0.68     Ca    9.0        10 Rashawn 2017 02:13    TPro  7.1    /  Alb  3.7    /  TBili  0.6    /  DBili  x      /  AST  19     /  ALT  17     /  AlkPhos  53     10 Rashawn 2017 02:13    PT/INR - ( 10 Rashawn 2017 02:13 )   PT: 10.5 sec;   INR: 0.96 ratio         PTT - ( 10 Rashawn 2017 02:13 )  PTT:32.4 sec    Creatine Kinase, Serum: 104 U/L (06-10 @ 02:13)    Troponin I, Serum: .000 ng/mL (06-10 @ 02:13)    EXAM:  CHEST-PORTABLE IMMEDIATE                          PROCEDURE DATE:  06/10/2017      INTERPRETATION:  Clinical information: Shortness of breath    Portable AP chest radiograph from Ariana 10, 2017, 0210 hours:    COMPARISON:  January 18, 2017    FINDINGS:  Evaluation is limited due to soft tissues obscuring the lung   bases.    There is mild right upper lobe atelectasis or scar. Possible small right   pleural effusion. Cardiac and mediastinal contours are stable. No   pneumothorax.     Ariana 10, 2017, 0231 hours:    Minimal right upper lobe opacity persists. There are basilar lung   opacities and possible small effusions.    IMPRESSION:    Bibasilar opacities could be on the basis of atelectasis or pneumonia.   Possible small bilateral pleural effusions. Recommend either PA and   lateral views or chest CT to further evaluate.

## 2017-06-10 NOTE — ED PROVIDER NOTE - MEDICAL DECISION MAKING DETAILS
resp insufficiency...subopt cxr with ?bibasilar infiltrates...still dyspneic somewhat on bipap..requires admission.begun  on ab...

## 2017-06-10 NOTE — H&P ADULT - ASSESSMENT
71 years old female with history of COPD, Anemia, history of respiratory failure, anxiety, who was at [facility]. Patient was having increasing shortness of breath. She was sent in to ER for same. Patient was in respiratory distress and was placed  on BiPAP. She is found to have respiratory failure. She is being admitted for further care.

## 2017-06-10 NOTE — H&P ADULT - PMH
Anxiety    Chronic obstructive pulmonary disease, unspecified COPD type    Depression    GERD (gastroesophageal reflux disease)    Herpes zoster with other complication  Left arm.  Insomnia, unspecified type    Neuropathy    Osteoporosis

## 2017-06-10 NOTE — CONSULT NOTE ADULT - ASSESSMENT
Pt with O2 dependent COPD c/o increasing sob for few days .  Acute respiratory failure , now on bipap

## 2017-06-10 NOTE — H&P ADULT - PROBLEM SELECTOR PLAN 1
Patient with acute on chronic respiratory failure with hypercapnia, POA.  Will continue patient on BiPAP as per pulmonary/CC.  Monitor pulse ox and ABG as per pulmonary.  Further management as per patient's clinical course.

## 2017-06-10 NOTE — ED PROVIDER NOTE - PMH
Chronic obstructive pulmonary disease, unspecified COPD type    Depression    GERD (gastroesophageal reflux disease)    Neuropathy

## 2017-06-11 LAB
ANION GAP SERPL CALC-SCNC: 9 MMOL/L — SIGNIFICANT CHANGE UP (ref 5–17)
BASE EXCESS BLDA CALC-SCNC: -2.2 MMOL/L — LOW (ref -2–2)
BASOPHILS # BLD AUTO: 0 K/UL — SIGNIFICANT CHANGE UP (ref 0–0.2)
BASOPHILS NFR BLD AUTO: 0.3 % — SIGNIFICANT CHANGE UP (ref 0–2)
BUN SERPL-MCNC: 18 MG/DL — SIGNIFICANT CHANGE UP (ref 7–23)
CALCIUM SERPL-MCNC: 8 MG/DL — LOW (ref 8.4–10.5)
CHLORIDE SERPL-SCNC: 103 MMOL/L — SIGNIFICANT CHANGE UP (ref 96–108)
CHOLEST SERPL-MCNC: 229 MG/DL — HIGH (ref 10–199)
CO2 SERPL-SCNC: 26 MMOL/L — SIGNIFICANT CHANGE UP (ref 22–31)
CREAT SERPL-MCNC: 0.56 MG/DL — SIGNIFICANT CHANGE UP (ref 0.5–1.3)
CULTURE RESULTS: SIGNIFICANT CHANGE UP
EOSINOPHIL # BLD AUTO: 0 K/UL — SIGNIFICANT CHANGE UP (ref 0–0.5)
EOSINOPHIL NFR BLD AUTO: 0.2 % — SIGNIFICANT CHANGE UP (ref 0–6)
GLUCOSE SERPL-MCNC: 125 MG/DL — HIGH (ref 70–99)
HBA1C BLD-MCNC: 5.4 % — SIGNIFICANT CHANGE UP (ref 4–5.6)
HCO3 BLDA-SCNC: 21 MMOL/L — SIGNIFICANT CHANGE UP (ref 21–29)
HCT VFR BLD CALC: 36.3 % — SIGNIFICANT CHANGE UP (ref 34.5–45)
HDLC SERPL-MCNC: 58 MG/DL — SIGNIFICANT CHANGE UP (ref 40–125)
HGB BLD-MCNC: 11.8 G/DL — SIGNIFICANT CHANGE UP (ref 11.5–15.5)
HOROWITZ INDEX BLDA+IHG-RTO: 35 — SIGNIFICANT CHANGE UP
LIPID PNL WITH DIRECT LDL SERPL: 146 MG/DL — HIGH
LYMPHOCYTES # BLD AUTO: 0.5 K/UL — LOW (ref 1–3.3)
LYMPHOCYTES # BLD AUTO: 5.9 % — LOW (ref 13–44)
MAGNESIUM SERPL-MCNC: 2.1 MG/DL — SIGNIFICANT CHANGE UP (ref 1.6–2.6)
MCHC RBC-ENTMCNC: 31.1 PG — SIGNIFICANT CHANGE UP (ref 27–34)
MCHC RBC-ENTMCNC: 32.5 GM/DL — SIGNIFICANT CHANGE UP (ref 32–36)
MCV RBC AUTO: 95.5 FL — SIGNIFICANT CHANGE UP (ref 80–100)
MONOCYTES # BLD AUTO: 0.2 K/UL — SIGNIFICANT CHANGE UP (ref 0–0.9)
MONOCYTES NFR BLD AUTO: 2 % — SIGNIFICANT CHANGE UP (ref 2–14)
NEUTROPHILS # BLD AUTO: 7.1 K/UL — SIGNIFICANT CHANGE UP (ref 1.8–7.4)
NEUTROPHILS NFR BLD AUTO: 91.6 % — HIGH (ref 43–77)
PCO2 BLDA: 62 MMHG — HIGH (ref 32–46)
PH BLD: 7.21 — LOW (ref 7.35–7.45)
PHOSPHATE SERPL-MCNC: 3 MG/DL — SIGNIFICANT CHANGE UP (ref 2.5–4.5)
PLATELET # BLD AUTO: 177 K/UL — SIGNIFICANT CHANGE UP (ref 150–400)
PO2 BLDA: 80 MMHG — SIGNIFICANT CHANGE UP (ref 74–108)
POTASSIUM SERPL-MCNC: 4 MMOL/L — SIGNIFICANT CHANGE UP (ref 3.5–5.3)
POTASSIUM SERPL-SCNC: 4 MMOL/L — SIGNIFICANT CHANGE UP (ref 3.5–5.3)
RBC # BLD: 3.8 M/UL — SIGNIFICANT CHANGE UP (ref 3.8–5.2)
RBC # FLD: 13.5 % — SIGNIFICANT CHANGE UP (ref 10.3–14.5)
SAO2 % BLDA: 96 % — SIGNIFICANT CHANGE UP (ref 92–96)
SODIUM SERPL-SCNC: 138 MMOL/L — SIGNIFICANT CHANGE UP (ref 135–145)
SPECIMEN SOURCE: SIGNIFICANT CHANGE UP
T3 SERPL-MCNC: 48 NG/DL — LOW (ref 80–200)
T4 AB SER-ACNC: 5.6 UG/DL — SIGNIFICANT CHANGE UP (ref 4.6–12)
TOTAL CHOLESTEROL/HDL RATIO MEASUREMENT: 3.9 RATIO — SIGNIFICANT CHANGE UP (ref 3.3–7.1)
TRIGL SERPL-MCNC: 127 MG/DL — SIGNIFICANT CHANGE UP (ref 10–149)
TSH SERPL-MCNC: 0.2 UIU/ML — LOW (ref 0.27–4.2)
WBC # BLD: 7.7 K/UL — SIGNIFICANT CHANGE UP (ref 3.8–10.5)
WBC # FLD AUTO: 7.7 K/UL — SIGNIFICANT CHANGE UP (ref 3.8–10.5)

## 2017-06-11 PROCEDURE — 71010: CPT | Mod: 26

## 2017-06-11 RX ORDER — ALPRAZOLAM 0.25 MG
0.25 TABLET ORAL ONCE
Refills: 0 | Status: DISCONTINUED | OUTPATIENT
Start: 2017-06-11 | End: 2017-06-11

## 2017-06-11 RX ORDER — TRAZODONE HCL 50 MG
25 TABLET ORAL ONCE
Refills: 0 | Status: COMPLETED | OUTPATIENT
Start: 2017-06-11 | End: 2017-06-11

## 2017-06-11 RX ADMIN — Medication 3 MILLILITER(S): at 20:40

## 2017-06-11 RX ADMIN — OXYCODONE HYDROCHLORIDE 5 MILLIGRAM(S): 5 TABLET ORAL at 23:00

## 2017-06-11 RX ADMIN — Medication 1 SPRAY(S): at 11:20

## 2017-06-11 RX ADMIN — Medication 40 MILLIGRAM(S): at 22:19

## 2017-06-11 RX ADMIN — FAMOTIDINE 20 MILLIGRAM(S): 10 INJECTION INTRAVENOUS at 05:08

## 2017-06-11 RX ADMIN — Medication 1 TABLET(S): at 05:14

## 2017-06-11 RX ADMIN — Medication 3 MILLILITER(S): at 13:28

## 2017-06-11 RX ADMIN — Medication 3 MILLILITER(S): at 01:35

## 2017-06-11 RX ADMIN — OXYCODONE HYDROCHLORIDE 5 MILLIGRAM(S): 5 TABLET ORAL at 01:00

## 2017-06-11 RX ADMIN — HEPARIN SODIUM 5000 UNIT(S): 5000 INJECTION INTRAVENOUS; SUBCUTANEOUS at 17:57

## 2017-06-11 RX ADMIN — GABAPENTIN 300 MILLIGRAM(S): 400 CAPSULE ORAL at 14:33

## 2017-06-11 RX ADMIN — OXYCODONE HYDROCHLORIDE 5 MILLIGRAM(S): 5 TABLET ORAL at 00:00

## 2017-06-11 RX ADMIN — Medication 40 MILLIGRAM(S): at 05:09

## 2017-06-11 RX ADMIN — OXYCODONE HYDROCHLORIDE 5 MILLIGRAM(S): 5 TABLET ORAL at 22:18

## 2017-06-11 RX ADMIN — Medication 1 DROP(S): at 14:33

## 2017-06-11 RX ADMIN — Medication 0.25 MILLIGRAM(S): at 11:20

## 2017-06-11 RX ADMIN — GABAPENTIN 300 MILLIGRAM(S): 400 CAPSULE ORAL at 05:09

## 2017-06-11 RX ADMIN — POLYETHYLENE GLYCOL 3350 17 GRAM(S): 17 POWDER, FOR SOLUTION ORAL at 11:20

## 2017-06-11 RX ADMIN — Medication 325 MILLIGRAM(S): at 11:20

## 2017-06-11 RX ADMIN — BUDESONIDE AND FORMOTEROL FUMARATE DIHYDRATE 1 PUFF(S): 160; 4.5 AEROSOL RESPIRATORY (INHALATION) at 06:06

## 2017-06-11 RX ADMIN — HEPARIN SODIUM 5000 UNIT(S): 5000 INJECTION INTRAVENOUS; SUBCUTANEOUS at 05:14

## 2017-06-11 RX ADMIN — FAMOTIDINE 20 MILLIGRAM(S): 10 INJECTION INTRAVENOUS at 17:57

## 2017-06-11 RX ADMIN — Medication 40 MILLIGRAM(S): at 14:33

## 2017-06-11 RX ADMIN — SODIUM CHLORIDE 75 MILLILITER(S): 9 INJECTION INTRAMUSCULAR; INTRAVENOUS; SUBCUTANEOUS at 11:20

## 2017-06-11 RX ADMIN — Medication 25 MILLIGRAM(S): at 01:18

## 2017-06-11 RX ADMIN — BUDESONIDE AND FORMOTEROL FUMARATE DIHYDRATE 1 PUFF(S): 160; 4.5 AEROSOL RESPIRATORY (INHALATION) at 17:58

## 2017-06-11 RX ADMIN — Medication 1 DROP(S): at 05:09

## 2017-06-11 RX ADMIN — Medication 3 MILLILITER(S): at 08:05

## 2017-06-11 RX ADMIN — Medication 1 TABLET(S): at 18:12

## 2017-06-11 RX ADMIN — Medication 0.25 MILLIGRAM(S): at 04:01

## 2017-06-11 RX ADMIN — GABAPENTIN 300 MILLIGRAM(S): 400 CAPSULE ORAL at 22:20

## 2017-06-11 RX ADMIN — MONTELUKAST 10 MILLIGRAM(S): 4 TABLET, CHEWABLE ORAL at 11:20

## 2017-06-11 RX ADMIN — TIOTROPIUM BROMIDE 1 CAPSULE(S): 18 CAPSULE ORAL; RESPIRATORY (INHALATION) at 06:07

## 2017-06-11 RX ADMIN — Medication 1 DROP(S): at 22:18

## 2017-06-11 NOTE — PROGRESS NOTE ADULT - ASSESSMENT
71 years old female with history of COPD, Anemia, history of respiratory failure, anxiety, who was at [facility]. Patient was having increasing shortness of breath. She was sent in to ER for same. Patient was in respiratory distress and was placed  on BiPAP. She is found to have respiratory failure. She is being admitted for further care.

## 2017-06-11 NOTE — PROGRESS NOTE ADULT - SUBJECTIVE AND OBJECTIVE BOX
Patient is a 71y old  Female who presents with a chief complaint of Shortness of breath (10 Rashawn 2017 14:32)    HPI:  71 years old female with history of COPD, Anemia, history of respiratory failure, anxiety, who was at [facility]. Patient was having increasing shortness of breath. She was sent in to ER for same. Patient was in respiratory distress and was placed  on BiPAP. She is admitted for further management.   Patient is currently on BiPAP and continues to c/o shortness of breath.   She denies any chest pain or pressure.  No nausea or vomiting.   No fever or chills.   No dizziness or syncope. (10 Rashawn 2017 14:32)    INTERVAL HPI/OVERNIGHT EVENTS:  Chart reviewed, notes reviewed.   Patient seen and examined.  Being followed by following specialists: Pulmonary    Consultant(s) Notes Reviewed:  [X] Yes    Care Discussed with Consultants/Other Providers: [X] Yes    REVIEW OF SYSTEMS:  CONSTITUTIONAL: No fever, weight loss, or fatigue EYES: No eye pain, visual disturbances, or discharge ENMT:  No difficulty hearing, tinnitus, vertigo; No sinus or throat pain NECK: No pain or stiffness BREASTS: No pain, masses, or nipple discharge RESPIRATORY: + shortness of breath CARDIOVASCULAR: No chest pain, palpitations, dizziness, or leg swelling GASTROINTESTINAL: No abdominal or epigastric pain. No nausea, vomiting, or hematemesis; No diarrhea or constipation. No melena or hematochezia. GENITOURINARY: No dysuria, frequency, hematuria, or incontinence NEUROLOGICAL: No headaches, memory loss, loss of strength, numbness, or tremors SKIN: No itching, burning, rashes, or lesions  LYMPH NODES: No enlarged glands ENDOCRINE: No heat or cold intolerance; No hair loss; No polydipsia or polyuria MUSCULOSKELETAL: No joint pain or swelling; No muscle, back, or extremity pain PSYCHIATRIC: No depression, anxiety, mood swings, or difficulty sleeping HEME/LYMPH: No easy bruising, or bleeding gums ALLERGY AND IMMUNOLOGIC: No hives or eczema	    Allergies : No Known Allergies    Intolerances      MEDICATIONS  (STANDING):  ALBUTerol/ipratropium for Nebulization 3milliLiter(s) Nebulizer every 6 hours  buDESOnide 160 MICROgram(s)/formoterol 4.5 MICROgram(s) Inhaler 1Puff(s) Inhalation two times a day  tiotropium 18 MICROgram(s) Capsule 1Capsule(s) Inhalation daily  methylPREDNISolone sodium succinate Injectable 40milliGRAM(s) IV Push every 8 hours  levoFLOXacin IVPB 500milliGRAM(s) IV Intermittent every 24 hours  heparin  Injectable 5000Unit(s) SubCutaneous every 12 hours  sodium chloride 0.9%. 1000milliLiter(s) IV Continuous <Continuous>  gabapentin 300milliGRAM(s) Oral three times a day  ALPRAZolam 0.25milliGRAM(s) Oral daily  famotidine Injectable 20milliGRAM(s) IV Push every 12 hours  ferrous    sulfate 325milliGRAM(s) Oral daily  polyethylene glycol 3350 17Gram(s) Oral daily  montelukast 10milliGRAM(s) Oral daily  fluticasone propionate 50 MICROgram(s)/spray Nasal Spray 1Spray(s) Both Nostrils daily  artificial tears (preservative free) Ophthalmic Solution 1Drop(s) Both EYES three times a day  calcium carbonate  625 mG + Vitamin D (OsCal 250 + D) 1Tablet(s) Oral two times a day    MEDICATIONS  (PRN):  acetaminophen   Tablet. 650milliGRAM(s) Oral every 6 hours PRN Mild Pain (1 - 3)  oxyCODONE IR 5milliGRAM(s) Oral every 6 hours PRN Moderate Pain (4 - 6)    Vital Signs Last 24 Hrs  T(C): 36.7, Max: 37.1 (06-11 @ 08:32)  T(F): 98, Max: 98.7 (06-11 @ 08:32)  HR: 98 (83 - 117)  BP: 142/62 (142/62 - 192/82)  BP(mean): 87 (87 - 116)  RR: 15 (15 - 26)  SpO2: 97% (92% - 100%)      PHYSICAL EXAM:  Physical Exam: PHYSICAL EXAM: GENERAL: On BiPAP, well-groomed, well-developed HEAD:  Atraumatic, Normocephalic EYES: EOMI, PERRLA, conjunctiva and sclera clear ENMT: No tonsillar erythema, exudates, or enlargement; Moist mucous membranes, Good dentition, No lesions NECK: Supple, No JVD, Normal thyroid CHEST/LUNG: B/L decreased breath sounds noted.  HEART: Regular rate and rhythm; No murmurs, rubs, or gallops ABDOMEN: Soft, Nontender, Nondistended; Bowel sounds present EXTREMITIES:  2+ Peripheral Pulses, No clubbing, cyanosis, or edema MS: No joint swelling or deformity.  LYMPH: No lymphadenopathy noted SKIN: No rashes or lesions NERVOUS SYSTEM:  Motor Strength 3/5 B/L upper and lower extremities; No gross focal deficit noted.  PSYCH:  sleepy but wakes up on calling her name. Oriented to place and person. .	         LABS:                   11.8   7.7   )-----------( 177      ( 2017 06:50 )             36.3     2017 06:50    138    |  103    |  18     ----------------------------<  125    4.0     |  26     |  0.56     Ca    8.0        2017 06:50  Phos  3.0       2017 06:50  Mg     2.1       2017 06:50    TPro  7.1    /  Alb  3.7    /  TBili  0.6    /  DBili  x      /  AST  19     /  ALT  17     /  AlkPhos  53     10 Rashawn 2017 02:13    CAPILLARY BLOOD GLUCOSE    PT/INR - ( 10 Rashawn 2017 02:13 )   PT: 10.5 sec;   INR: 0.96 ratio         PTT - ( 10 Rashawn 2017 02:13 )  PTT:32.4 sec   GninmggunpO2F 5.4     Chol 229<H> <H> HDL 58 Trig 127    Creatine Kinase, Serum: 104 U/L (06-10 @ 02:13)    Troponin I, Serum: .000 ng/mL (06-10 @ 02:13)    Culture Results:   Normal Urogenital fanny present (06-10 @ 12:28)  Culture Results:   No growth to date. (06-10 @ 11:40)  Culture Results:   No growth to date. (06-10 @ 11:40)    PT/INR - ( 10 Rashawn 2017 02:13 )   PT: 10.5 sec;   INR: 0.96 ratio         PTT - ( 10 Rashawn 2017 02:13 )  PTT:32.4 sec  Urinalysis Basic - ( 10 Rashawn 2017 02:13 )    Color: Yellow / Appearance: Clear / S.020 / pH: x  Gluc: x / Ketone: Negative  / Bili: Negative / Urobili: Negative mg/dL   Blood: x / Protein: 15 mg/dL / Nitrite: Negative   Leuk Esterase: Moderate / RBC: 6-10 /HPF / WBC 11-25   Sq Epi: x / Non Sq Epi: Few / Bacteria: Few        RADIOLOGY TEST: (IMAGES REVIEWED BY ME)  EXAM:  CHEST PORTABLE ROUTINE                        PROCEDURE DATE:  2017      INTERPRETATION:  Clinical information: COPD.    Portable semierect AP film of the chest is submitted. This is compared   with the previous chest from dated Ariana 10, 2017.     Findings: The heart is normal in size. The left hilar region is minimally   prominent.    There is minimal blunting of the left lateral costophrenic angles.    Cannot exclude small linear atelectasis at the lung bases.    Cannot exclude faint small nodularity right upper lobe. This may   represent the previously mentioned right upper lobe scarring.    The visualized bony structures are intact.     Impression: Cannot exclude faint small nodularity right upper lobe. This   is in the region of the previously seen small right upper lobe   atelectasis/scarring. If clinically warranted, further evaluation with   chest CT examination can be performed.    Imaging Personally Reviewed:  [X] YES        HEALTH ISSUES - PROBLEM Dx:  Acute respiratory failure with hypercapnia: Acute respiratory failure with hypercapnia  Need for prophylactic measure: Need for prophylactic measure  Depression, unspecified depression type: Depression, unspecified depression type  Osteoporosis, unspecified osteoporosis type, unspecified pathological fracture presence: Osteoporosis, unspecified osteoporosis type, unspecified pathological fracture presence  Anxiety: Anxiety  Gastroesophageal reflux disease, esophagitis presence not specified: Gastroesophageal reflux disease, esophagitis presence not specified  Acute on chronic respiratory failure with hypercapnia: Acute on chronic respiratory failure with hypercapnia  GERD (gastroesophageal reflux disease): GERD (gastroesophageal reflux disease)  Depression: Depression  COPD exacerbation: COPD exacerbation  Acute respiratory failure: Acute respiratory failure

## 2017-06-11 NOTE — PROGRESS NOTE ADULT - ASSESSMENT
Pt with O2 dependent COPD c/o increasing sob for few days .  Acute Hypercarbic respiratory failure , now on bipap. No evidence bacterial infection.

## 2017-06-11 NOTE — DIETITIAN INITIAL EVALUATION ADULT. - OTHER INFO
Pt admitted with primary dx of abnormal breathing and increased SOB. On bipap and from assisted living facility, was on regular diet. Reprots good appetite and intake PTA however currently reports decreased appetite 2' to not feeling well. Suggest Ensure pudding (chocolate) daily (170kcals, 4gm pro) as per same. Intake encouraged. Currently receiving puree with nectar thick liquids, tolerating well. Reports UBW ~118-121#, current weight 129# indicating no weight loss. Glu elevated (125), currently receiving IV steroids for COPD exacerbation. BMI acceptable for pt (25.4).

## 2017-06-11 NOTE — PROGRESS NOTE ADULT - SUBJECTIVE AND OBJECTIVE BOX
INTERVAL HPI/OVERNIGHT EVENTS:    S:    Pt seen and examined  71F HD #2  Here for hypercarbic resp failure requiring bipap      PRESSORS: [ ] YES [ ] NO  WHICH:  DOSE:    ANTIBIOTICS:                  DATE STARTED:  ANTIBIOTICS:                  DATE STARTED:  ANTIBIOTICS:                  DATE STARTED:    MEDICATIONS  (STANDING):  ALBUTerol/ipratropium for Nebulization 3milliLiter(s) Nebulizer every 6 hours  buDESOnide 160 MICROgram(s)/formoterol 4.5 MICROgram(s) Inhaler 1Puff(s) Inhalation two times a day  tiotropium 18 MICROgram(s) Capsule 1Capsule(s) Inhalation daily  methylPREDNISolone sodium succinate Injectable 40milliGRAM(s) IV Push every 8 hours  levoFLOXacin IVPB 500milliGRAM(s) IV Intermittent every 24 hours  heparin  Injectable 5000Unit(s) SubCutaneous every 12 hours  sodium chloride 0.9%. 1000milliLiter(s) IV Continuous <Continuous>  gabapentin 300milliGRAM(s) Oral three times a day  ALPRAZolam 0.25milliGRAM(s) Oral daily  famotidine Injectable 20milliGRAM(s) IV Push every 12 hours  ferrous    sulfate 325milliGRAM(s) Oral daily  polyethylene glycol 3350 17Gram(s) Oral daily  montelukast 10milliGRAM(s) Oral daily  fluticasone propionate 50 MICROgram(s)/spray Nasal Spray 1Spray(s) Both Nostrils daily  artificial tears (preservative free) Ophthalmic Solution 1Drop(s) Both EYES three times a day  calcium carbonate  625 mG + Vitamin D (OsCal 250 + D) 1Tablet(s) Oral two times a day    MEDICATIONS  (PRN):  acetaminophen   Tablet. 650milliGRAM(s) Oral every 6 hours PRN Mild Pain (1 - 3)  oxyCODONE IR 5milliGRAM(s) Oral every 6 hours PRN Moderate Pain (4 - 6)      Drug Dosing Weight  Height (cm): 152.4 (10 Rashawn 2017 01:48)  Weight (kg): 59 (10 Rashawn 2017 01:48)  BMI (kg/m2): 25.4 (10 Rashawn 2017 01:48)  BSA (m2): 1.55 (10 Rashawn 2017 01:48)    CENTRAL LINE: [ ] YES [ ] NO  LOCATION:   DATE INSERTED:  REMOVE: [ ] YES [ ] NO  EXPLAIN:    PORTER: [ ] YES [ ] NO    DATE INSERTED:  REMOVE: [ ] YES [ ] NO  EXPLAIN:    A-LINE: [ ] YES [ ] NO  LOCATION:   DATE INSERTED:  REMOVE: [ ] YES [ ] NO  EXPLAIN:    PAST MEDICAL & SURGICAL HISTORY:  Osteoporosis  Insomnia, unspecified type  Anxiety  Herpes zoster with other complication: Left arm.  Depression  Neuropathy  GERD (gastroesophageal reflux disease)  Chronic obstructive pulmonary disease, unspecified COPD type  Chronic obstructive pulmonary disease with acute lower respiratory infection  No significant past surgical history      ICU Vital Signs Last 24 Hrs  T(C): 37, Max: 37.1 ( @ 08:32)  T(F): 98.6, Max: 98.7 ( @ 08:32)  HR: 97 (82 - 105)  BP: 154/57 (133/67 - 173/91)  BP(mean): 87 (87 - 116)  ABP: --  ABP(mean): --  RR: 23 (15 - 26)  SpO2: 96% (92% - 100%)      ABG - ( 2017 06:36 )  pH: x     /  pCO2: 62    /  pO2: 80    / HCO3: 21    / Base Excess: -2.2  /  SaO2: 96                  I&O's Detail  I & Os for 24h ending 2017 07:00  =============================================  IN:    sodium chloride 0.9%.: 1500 ml    IV PiggyBack: 100 ml    Total IN: 1600 ml  ---------------------------------------------  OUT:    Voided: 800 ml    Total OUT: 800 ml  ---------------------------------------------  Total NET: 800 ml    I & Os for current day (as of 2017 22:37)  =============================================  IN:    sodium chloride 0.9%.: 900 ml    Oral Fluid: 520 ml    Total IN: 1420 ml  ---------------------------------------------  OUT:    Voided: 440 ml    Total OUT: 440 ml  ---------------------------------------------  Total NET: 980 ml          PHYSICAL EXAM:    Constitutional: NAD, well-groomed, well-developed  HEENT: PERRLA, EOMI, no drainage or redness  Neck: No bruits; no thyromegaly or nodules,  No JVD  Back: Normal spine flexure, No CVA tenderness, No deformity or limitation of movement  Respiratory: Breath Sounds equal & clear to percussion & auscultation, no accessory muscle use  Cardiovascular: Regular rate & rhythm, normal S1, S2; no murmurs, gallops or rubs; no S3, S4  Gastrointestinal: Soft, non-tender, no hepatosplenomegaly, normal bowel sounds  Extremities: No peripheral edema, No cyanosis, clubbing   Vascular: Equal and normal pulses: 2+ peripheral pulses throughout  Neurological: GCS:   (  /   /   ). A&O x 3; no sensory, motor or coordination deficits, normal reflexes  Psychiatric: Normal mood, normal affect  Musculoskeletal: No joint pain, swelling or deformity; no limitation of movement  Skin: No rashes        LABS:  CBC Full  -  ( 2017 06:50 )  WBC Count : 7.7 K/uL  Hemoglobin : 11.8 g/dL  Hematocrit : 36.3 %  Platelet Count - Automated : 177 K/uL  Mean Cell Volume : 95.5 fl  Mean Cell Hemoglobin : 31.1 pg  Mean Cell Hemoglobin Concentration : 32.5 gm/dL  Auto Neutrophil # : 7.1 K/uL  Auto Lymphocyte # : 0.5 K/uL  Auto Monocyte # : 0.2 K/uL  Auto Eosinophil # : 0.0 K/uL  Auto Basophil # : 0.0 K/uL  Auto Neutrophil % : 91.6 %  Auto Lymphocyte % : 5.9 %  Auto Monocyte % : 02.0 %  Auto Eosinophil % : 0.2 %  Auto Basophil % : 0.3 %        138  |  103  |  18  ----------------------------<  125<H>  4.0   |  26  |  0.56    Ca    8.0<L>      2017 06:50  Phos  3.0       Mg     2.1         TPro  7.1  /  Alb  3.7  /  TBili  0.6  /  DBili  x   /  AST  19  /  ALT  17  /  AlkPhos  53  06-10    PT/INR - ( 10 Rashawn 2017 02:13 )   PT: 10.5 sec;   INR: 0.96 ratio         PTT - ( 10 Rashawn 2017 02:13 )  PTT:32.4 sec  Urinalysis Basic - ( 10 Rashawn 2017 02:13 )    Color: Yellow / Appearance: Clear / S.020 / pH: x  Gluc: x / Ketone: Negative  / Bili: Negative / Urobili: Negative mg/dL   Blood: x / Protein: 15 mg/dL / Nitrite: Negative   Leuk Esterase: Moderate / RBC: 6-10 /HPF / WBC 11-25   Sq Epi: x / Non Sq Epi: Few / Bacteria: Few        Assessment and Plan:    Neuro: GCS 15. Monitor for delirium.  Continue to optimize pain control. Serial Neurologic assessments    HEENT: no issues    CV: Continue hemodynamic monitoring    Pulm: Pulmonary toilet.  Continue incentive spirometer.  Chest PT.  Encourage OOB to chair and ambulation     GI/Nutrition: Bowel Regimen    /Renal: monitor UOP. Monitor BMP.  Replete Lytes as needed      HEME- DVT prophylaxis, SCDs    ID:     Lines/Tubes:     Endo:     Skin:     Dispo:

## 2017-06-11 NOTE — PROGRESS NOTE ADULT - SUBJECTIVE AND OBJECTIVE BOX
PULMONARY/CRITICAL CARE      INTERVAL HPI/OVERNIGHT EVENTS: Pt unchanged. Alert and conversant. Mild sob. On bipap.    71y FemaleHPI:  71 years old female with history of COPD, Anemia, history of respiratory failure, anxiety, who was at [facility]. Patient was having increasing shortness of breath. She was sent in to ER for same. Patient was in respiratory distress and was placed  on BiPAP. She is admitted for further management.   Patient is currently on BiPAP and continues to c/o shortness of breath.   She denies any chest pain or pressure.  No nausea or vomiting.   No fever or chills.   No dizziness or syncope. (10 Rashawn 2017 14:32)        PAST MEDICAL & SURGICAL HISTORY:  Osteoporosis  Insomnia, unspecified type  Anxiety  Herpes zoster with other complication: Left arm.  Depression  Neuropathy  GERD (gastroesophageal reflux disease)  Chronic obstructive pulmonary disease, unspecified COPD type  Chronic obstructive pulmonary disease with acute lower respiratory infection  No significant past surgical history        ICU Vital Signs Last 24 Hrs  T(C): 36.7, Max: 36.9 (06-10 @ 08:27)  T(F): 98.1, Max: 98.4 (06-10 @ 08:27)  HR: 99 (93 - 117)  BP: 173/71 (118/60 - 192/82)  BP(mean): 99 (90 - 114)  ABP: --  ABP(mean): --  RR: 23 (19 - 26)  SpO2: 96% (95% - 100%)    Qtts:     I&O's Summary    I & Os for current day (as of 2017 07:41)  =============================================  IN: 1525 ml / OUT: 800 ml / NET: 725 ml          REVIEW OF SYSTEMS:    CONSTITUTIONAL: No fever, weight loss, or fatigue  EYES: No eye pain, visual disturbances, or discharge  ENMT:  No difficulty hearing, tinnitus, vertigo; No sinus or throat pain  NECK: No pain or stiffness  BREASTS: No pain, masses, or nipple discharge  RESPIRATORY: No cough, wheezing, chills or hemoptysis; mild shortness of breath  CARDIOVASCULAR: No chest pain, palpitations, dizziness, or leg swelling  GASTROINTESTINAL: No abdominal or epigastric pain. No nausea, vomiting, or hematemesis; No diarrhea or constipation. No melena or hematochezia.  GENITOURINARY: No dysuria, frequency, hematuria, or incontinence  NEUROLOGICAL: No headaches, memory loss, loss of strength, numbness, or tremors  SKIN: No itching, burning, rashes, or lesions   LYMPH NODES: No enlarged glands  ENDOCRINE: No heat or cold intolerance; No hair loss  MUSCULOSKELETAL: No joint pain or swelling; No muscle, back, or extremity pain, no calf tenderness  PSYCHIATRIC: No depression, anxiety, mood swings, or difficulty sleeping  HEME/LYMPH: No easy bruising, or bleeding gums  ALLERGY AND IMMUNOLOGIC: No hives or eczema      PHYSICAL EXAM:    GENERAL: NAD, well-groomed, well-developed, NAD on bipap  HEAD:  Atraumatic, Normocephalic  EYES: EOMI, PERRLA, conjunctiva and sclera clear  ENMT: No tonsillar erythema, exudates, or enlargement; Moist mucous membranes, Good dentition, No lesions  NECK: Supple, No JVD, Normal thyroid  NERVOUS SYSTEM:  Alert & Oriented X3, Good concentration; Motor Strength 5/5 B/L upper and lower extremities  CHEST/LUNG: Few wheezes bilat. Good excursion. No change percussion, fremitus  HEART: Regular rate and rhythm; No murmurs, rubs, or gallops  ABDOMEN: Soft, Nontender, Nondistended; Bowel sounds present  EXTREMITIES:  2+ Peripheral Pulses, No clubbing, cyanosis, or edema  LYMPH: No lymphadenopathy noted  SKIN: No rashes or lesions        LABS:                        11.8   7.7   )-----------( 177      ( 2017 06:50 )             36.3     -    138  |  103  |  18  ----------------------------<  125<H>  4.0   |  26  |  0.56    Ca    8.0<L>      2017 06:50  Phos  3.0       Mg     2.1         TPro  7.1  /  Alb  3.7  /  TBili  0.6  /  DBili  x   /  AST  19  /  ALT  17  /  AlkPhos  53  06-10    PT/INR - ( 10 Rashawn 2017 02:13 )   PT: 10.5 sec;   INR: 0.96 ratio         PTT - ( 10 Rashawn 2017 02:13 )  PTT:32.4 sec  Urinalysis Basic - ( 10 Rashawn 2017 02:13 )    Color: Yellow / Appearance: Clear / S.020 / pH: x  Gluc: x / Ketone: Negative  / Bili: Negative / Urobili: Negative mg/dL   Blood: x / Protein: 15 mg/dL / Nitrite: Negative   Leuk Esterase: Moderate / RBC: 6-10 /HPF / WBC 11-25   Sq Epi: x / Non Sq Epi: Few / Bacteria: Few      ABG - ( 2017 06:36 )  pH: x     /  pCO2: 62    /  pO2: 80    / HCO3: 21    / Base Excess: -2.2  /  SaO2: 96                Procalcitonin neg.    RADIOLOGY & ADDITIONAL STUDIES: CXR ok  Echo--mild Ph      CRITICAL CARE TIME SPENT:

## 2017-06-12 LAB
ALBUMIN SERPL ELPH-MCNC: 2.9 G/DL — LOW (ref 3.3–5)
ALP SERPL-CCNC: 46 U/L — SIGNIFICANT CHANGE UP (ref 30–120)
ALT FLD-CCNC: 23 U/L DA — SIGNIFICANT CHANGE UP (ref 10–60)
ANION GAP SERPL CALC-SCNC: 3 MMOL/L — LOW (ref 5–17)
AST SERPL-CCNC: 30 U/L — SIGNIFICANT CHANGE UP (ref 10–40)
BASE EXCESS BLDA CALC-SCNC: 2.6 MMOL/L — HIGH (ref -2–2)
BILIRUB SERPL-MCNC: 0.3 MG/DL — SIGNIFICANT CHANGE UP (ref 0.2–1.2)
BLOOD GAS SOURCE: SIGNIFICANT CHANGE UP
BUN SERPL-MCNC: 15 MG/DL — SIGNIFICANT CHANGE UP (ref 7–23)
CALCIUM SERPL-MCNC: 7.7 MG/DL — LOW (ref 8.4–10.5)
CHLORIDE SERPL-SCNC: 104 MMOL/L — SIGNIFICANT CHANGE UP (ref 96–108)
CO2 SERPL-SCNC: 33 MMOL/L — HIGH (ref 22–31)
CREAT SERPL-MCNC: 0.56 MG/DL — SIGNIFICANT CHANGE UP (ref 0.5–1.3)
GLUCOSE SERPL-MCNC: 146 MG/DL — HIGH (ref 70–99)
HCO3 BLDA-SCNC: 27 MMOL/L — SIGNIFICANT CHANGE UP (ref 21–29)
HCT VFR BLD CALC: 31.9 % — LOW (ref 34.5–45)
HGB BLD-MCNC: 11.2 G/DL — LOW (ref 11.5–15.5)
HOROWITZ INDEX BLDA+IHG-RTO: 3 — SIGNIFICANT CHANGE UP
MAGNESIUM SERPL-MCNC: 2.4 MG/DL — SIGNIFICANT CHANGE UP (ref 1.6–2.6)
MCHC RBC-ENTMCNC: 33.2 PG — SIGNIFICANT CHANGE UP (ref 27–34)
MCHC RBC-ENTMCNC: 35.2 GM/DL — SIGNIFICANT CHANGE UP (ref 32–36)
MCV RBC AUTO: 94.4 FL — SIGNIFICANT CHANGE UP (ref 80–100)
PCO2 BLDA: 45 MMHG — SIGNIFICANT CHANGE UP (ref 32–46)
PH BLD: 7.4 — SIGNIFICANT CHANGE UP (ref 7.35–7.45)
PLATELET # BLD AUTO: 160 K/UL — SIGNIFICANT CHANGE UP (ref 150–400)
PO2 BLDA: 126 MMHG — HIGH (ref 74–108)
POTASSIUM SERPL-MCNC: 3.8 MMOL/L — SIGNIFICANT CHANGE UP (ref 3.5–5.3)
POTASSIUM SERPL-SCNC: 3.8 MMOL/L — SIGNIFICANT CHANGE UP (ref 3.5–5.3)
PROT SERPL-MCNC: 5.8 G/DL — LOW (ref 6–8.3)
RBC # BLD: 3.38 M/UL — LOW (ref 3.8–5.2)
RBC # FLD: 12.9 % — SIGNIFICANT CHANGE UP (ref 10.3–14.5)
SAO2 % BLDA: 99 % — HIGH (ref 92–96)
SODIUM SERPL-SCNC: 140 MMOL/L — SIGNIFICANT CHANGE UP (ref 135–145)
WBC # BLD: 6.9 K/UL — SIGNIFICANT CHANGE UP (ref 3.8–10.5)
WBC # FLD AUTO: 6.9 K/UL — SIGNIFICANT CHANGE UP (ref 3.8–10.5)

## 2017-06-12 PROCEDURE — 71010: CPT | Mod: 26

## 2017-06-12 PROCEDURE — 71250 CT THORAX DX C-: CPT | Mod: 26

## 2017-06-12 RX ORDER — ALPRAZOLAM 0.25 MG
0.25 TABLET ORAL ONCE
Refills: 0 | Status: DISCONTINUED | OUTPATIENT
Start: 2017-06-12 | End: 2017-06-12

## 2017-06-12 RX ORDER — ALPRAZOLAM 0.25 MG
0.25 TABLET ORAL THREE TIMES A DAY
Refills: 0 | Status: COMPLETED | OUTPATIENT
Start: 2017-06-12 | End: 2017-06-19

## 2017-06-12 RX ADMIN — HEPARIN SODIUM 5000 UNIT(S): 5000 INJECTION INTRAVENOUS; SUBCUTANEOUS at 17:24

## 2017-06-12 RX ADMIN — Medication 40 MILLIGRAM(S): at 21:24

## 2017-06-12 RX ADMIN — Medication 1 TABLET(S): at 05:43

## 2017-06-12 RX ADMIN — Medication 0.25 MILLIGRAM(S): at 14:36

## 2017-06-12 RX ADMIN — Medication 1 DROP(S): at 06:39

## 2017-06-12 RX ADMIN — MONTELUKAST 10 MILLIGRAM(S): 4 TABLET, CHEWABLE ORAL at 11:27

## 2017-06-12 RX ADMIN — Medication 40 MILLIGRAM(S): at 05:43

## 2017-06-12 RX ADMIN — Medication 1 TABLET(S): at 17:23

## 2017-06-12 RX ADMIN — GABAPENTIN 300 MILLIGRAM(S): 400 CAPSULE ORAL at 05:43

## 2017-06-12 RX ADMIN — POLYETHYLENE GLYCOL 3350 17 GRAM(S): 17 POWDER, FOR SOLUTION ORAL at 11:27

## 2017-06-12 RX ADMIN — SODIUM CHLORIDE 75 MILLILITER(S): 9 INJECTION INTRAMUSCULAR; INTRAVENOUS; SUBCUTANEOUS at 14:24

## 2017-06-12 RX ADMIN — GABAPENTIN 300 MILLIGRAM(S): 400 CAPSULE ORAL at 13:57

## 2017-06-12 RX ADMIN — Medication 325 MILLIGRAM(S): at 11:27

## 2017-06-12 RX ADMIN — GABAPENTIN 300 MILLIGRAM(S): 400 CAPSULE ORAL at 21:24

## 2017-06-12 RX ADMIN — OXYCODONE HYDROCHLORIDE 5 MILLIGRAM(S): 5 TABLET ORAL at 22:00

## 2017-06-12 RX ADMIN — FAMOTIDINE 20 MILLIGRAM(S): 10 INJECTION INTRAVENOUS at 17:23

## 2017-06-12 RX ADMIN — Medication 3 MILLILITER(S): at 07:25

## 2017-06-12 RX ADMIN — BUDESONIDE AND FORMOTEROL FUMARATE DIHYDRATE 1 PUFF(S): 160; 4.5 AEROSOL RESPIRATORY (INHALATION) at 19:11

## 2017-06-12 RX ADMIN — Medication 40 MILLIGRAM(S): at 13:57

## 2017-06-12 RX ADMIN — Medication 3 MILLILITER(S): at 01:41

## 2017-06-12 RX ADMIN — Medication 1 SPRAY(S): at 11:27

## 2017-06-12 RX ADMIN — Medication 1 DROP(S): at 21:24

## 2017-06-12 RX ADMIN — Medication 3 MILLILITER(S): at 19:08

## 2017-06-12 RX ADMIN — Medication 3 MILLILITER(S): at 13:06

## 2017-06-12 RX ADMIN — BUDESONIDE AND FORMOTEROL FUMARATE DIHYDRATE 1 PUFF(S): 160; 4.5 AEROSOL RESPIRATORY (INHALATION) at 05:44

## 2017-06-12 RX ADMIN — OXYCODONE HYDROCHLORIDE 5 MILLIGRAM(S): 5 TABLET ORAL at 21:28

## 2017-06-12 RX ADMIN — FAMOTIDINE 20 MILLIGRAM(S): 10 INJECTION INTRAVENOUS at 06:39

## 2017-06-12 RX ADMIN — TIOTROPIUM BROMIDE 1 CAPSULE(S): 18 CAPSULE ORAL; RESPIRATORY (INHALATION) at 05:44

## 2017-06-12 RX ADMIN — Medication 0.25 MILLIGRAM(S): at 08:38

## 2017-06-12 RX ADMIN — Medication 0.25 MILLIGRAM(S): at 21:24

## 2017-06-12 RX ADMIN — HEPARIN SODIUM 5000 UNIT(S): 5000 INJECTION INTRAVENOUS; SUBCUTANEOUS at 05:43

## 2017-06-12 NOTE — PROGRESS NOTE ADULT - ASSESSMENT
Pt with O2 dependent COPD c/o increasing sob for few days .  Acute Hypercarbic respiratory failure , now on bipap. No evidence bacterial infection. Still wheezing significantly and hypercarbic.

## 2017-06-12 NOTE — PROGRESS NOTE ADULT - SUBJECTIVE AND OBJECTIVE BOX
Patient is a 71y old  Female who presents with a chief complaint of Shortness of breath (10 Rashawn 2017 14:32)      BRIEF HOSPITAL COURSE:   71F admitted with hypercarbic resp. failure requiring BiPAP    Events last 24 hours:   -has improved currently off BiPAP    PAST MEDICAL & SURGICAL HISTORY:  Osteoporosis  Insomnia, unspecified type  Anxiety  Herpes zoster with other complication: Left arm.  Depression  Neuropathy  GERD (gastroesophageal reflux disease)  Chronic obstructive pulmonary disease, unspecified COPD type  Chronic obstructive pulmonary disease with acute lower respiratory infection  No significant past surgical history      Review of Systems:  CONSTITUTIONAL: No fever, chills, or fatigue  EYES: No eye pain, visual disturbances, or discharge  ENMT:  No difficulty hearing, tinnitus, vertigo; No sinus or throat pain  NECK: No pain or stiffness  RESPIRATORY: No cough, wheezing, chills or hemoptysis; No shortness of breath  CARDIOVASCULAR: No chest pain, palpitations, dizziness, or leg swelling  GASTROINTESTINAL: No abdominal or epigastric pain. No nausea, vomiting, or hematemesis; No diarrhea or constipation. No melena or hematochezia.  GENITOURINARY: No dysuria, frequency, hematuria, or incontinence  NEUROLOGICAL: No headaches, memory loss, loss of strength, numbness, or tremors  SKIN: No itching, burning, rashes, or lesions   MUSCULOSKELETAL: No joint pain or swelling; No muscle, back, or extremity pain  PSYCHIATRIC: No depression, anxiety, mood swings, or difficulty sleeping      Medications:  levoFLOXacin IVPB 500milliGRAM(s) IV Intermittent every 24 hours      ALBUTerol/ipratropium for Nebulization 3milliLiter(s) Nebulizer every 6 hours  buDESOnide 160 MICROgram(s)/formoterol 4.5 MICROgram(s) Inhaler 1Puff(s) Inhalation two times a day  tiotropium 18 MICROgram(s) Capsule 1Capsule(s) Inhalation daily  montelukast 10milliGRAM(s) Oral daily    acetaminophen   Tablet. 650milliGRAM(s) Oral every 6 hours PRN  oxyCODONE IR 5milliGRAM(s) Oral every 6 hours PRN  gabapentin 300milliGRAM(s) Oral three times a day  ALPRAZolam 0.25milliGRAM(s) Oral three times a day      heparin  Injectable 5000Unit(s) SubCutaneous every 12 hours    famotidine Injectable 20milliGRAM(s) IV Push every 12 hours  polyethylene glycol 3350 17Gram(s) Oral daily      methylPREDNISolone sodium succinate Injectable 40milliGRAM(s) IV Push every 8 hours    sodium chloride 0.9%. 1000milliLiter(s) IV Continuous <Continuous>  ferrous    sulfate 325milliGRAM(s) Oral daily  calcium carbonate  625 mG + Vitamin D (OsCal 250 + D) 1Tablet(s) Oral two times a day      fluticasone propionate 50 MICROgram(s)/spray Nasal Spray 1Spray(s) Both Nostrils daily  artificial tears (preservative free) Ophthalmic Solution 1Drop(s) Both EYES three times a day            ICU Vital Signs Last 24 Hrs  T(C): 36.4, Max: 37.1 (06-11 @ 23:56)  T(F): 97.6, Max: 98.8 (06-11 @ 23:56)  HR: 99 (66 - 99)  BP: 160/82 (128/91 - 203/80)  BP(mean): 104 (84 - 129)  ABP: --  ABP(mean): --  RR: 18 (11 - 25)  SpO2: 98% (94% - 100%)      ABG - ( 12 Jun 2017 08:01 )  pH: x     /  pCO2: 45    /  pO2: 126   / HCO3: 27    / Base Excess: 2.6   /  SaO2: 99                  I&O's Detail  I & Os for 24h ending 12 Jun 2017 07:00  =============================================  IN:    sodium chloride 0.9%.: 1500 ml    Oral Fluid: 520 ml    IV PiggyBack: 100 ml    Total IN: 2120 ml  ---------------------------------------------  OUT:    Voided: 440 ml    Total OUT: 440 ml  ---------------------------------------------  Total NET: 1680 ml    I & Os for current day (as of 12 Jun 2017 20:51)  =============================================  IN:    sodium chloride 0.9%.: 600 ml    Oral Fluid: 320 ml    Total IN: 920 ml  ---------------------------------------------  OUT:    Voided: 550 ml    Total OUT: 550 ml  ---------------------------------------------  Total NET: 370 ml        LABS:                        11.2   6.9   )-----------( 160      ( 12 Jun 2017 06:33 )             31.9     06-12    140  |  104  |  15  ----------------------------<  146<H>  3.8   |  33<H>  |  0.56    Ca    7.7<L>      12 Jun 2017 06:33  Phos  3.0     06-11  Mg     2.4     06-12    TPro  5.8<L>  /  Alb  2.9<L>  /  TBili  0.3  /  DBili  x   /  AST  30  /  ALT  23  /  AlkPhos  46  06-12          CAPILLARY BLOOD GLUCOSE        CULTURES:  Culture Results:   Normal Urogenital fanny present (06-10 @ 12:28)  Culture Results:   No growth to date. (06-10 @ 11:40)  Culture Results:   No growth to date. (06-10 @ 11:40)      Physical Examination:    General: No acute distress.  Alert, oriented, interactive, nonfocal    HEENT: Pupils equal, reactive to light.  Symmetric.    PULM: Clear to auscultation bilaterally, no significant sputum production    CVS: Regular rate and rhythm, no murmurs, rubs, or gallops    ABD: Soft, nondistended, nontender, normoactive bowel sounds, no masses    EXT: No edema, nontender    SKIN: Warm and well perfused, no rashes noted.

## 2017-06-12 NOTE — PROGRESS NOTE ADULT - ASSESSMENT
71 years old female with history of COPD, Anemia, history of respiratory failure, anxiety, who was at [facility]. Patient was having increasing shortness of breath. She was sent in to ER for same. Patient was in respiratory distress and was placed  on BiPAP. She is found to have respiratory failure. She is being admitted for further care.

## 2017-06-12 NOTE — SWALLOW BEDSIDE ASSESSMENT ADULT - COMMENTS
Pt alert, oriented, cooperative.  Pt admitted with SOB, respiratory failure, hx of GERD.  Pt on nasal canula at this time; O2 sats at 98, however SOB while eating dinner.  Pt presents with oropharyngeal dysphagia and difficulty coordinating respiration with deglutition and mastication characterized by reduced oral grading, labored formation of the bolus, latent AP transport,  swallow delay.

## 2017-06-12 NOTE — PROGRESS NOTE ADULT - SUBJECTIVE AND OBJECTIVE BOX
Patient is a 71y old  Female who presents with a chief complaint of Shortness of breath (10 Rashawn 2017 14:32)    HPI:  71 years old female with history of COPD, Anemia, history of respiratory failure, anxiety, who was at [facility]. Patient was having increasing shortness of breath. She was sent in to ER for same. Patient was in respiratory distress and was placed  on BiPAP. She is admitted for further management.   Patient is currently on BiPAP and continues to c/o shortness of breath.   She denies any chest pain or pressure.  No nausea or vomiting.   No fever or chills.   No dizziness or syncope. (10 Rashawn 2017 14:32)    INTERVAL HPI/OVERNIGHT EVENTS:  Chart reviewed, notes reviewed.   Patient seen and examined.  Being followed by following specialists: Pulmonary    Consultant(s) Notes Reviewed:  [X] Yes    Care Discussed with Consultants/Other Providers: [X] Yes  Still on BiPAP.  Doing better.  Wants more Xanax as she still feels anxious.   No chest pain or pressure.   No fever or chills.     REVIEW OF SYSTEMS:  CONSTITUTIONAL: No fever, weight loss, or fatigue EYES: No eye pain, visual disturbances, or discharge ENMT:  No difficulty hearing, tinnitus, vertigo; No sinus or throat pain NECK: No pain or stiffness BREASTS: No pain, masses, or nipple discharge RESPIRATORY: + shortness of breath CARDIOVASCULAR: No chest pain, palpitations, dizziness, or leg swelling GASTROINTESTINAL: No abdominal or epigastric pain. No nausea, vomiting, or hematemesis; No diarrhea or constipation. No melena or hematochezia. GENITOURINARY: No dysuria, frequency, hematuria, or incontinence NEUROLOGICAL: No headaches, memory loss, loss of strength, numbness, or tremors SKIN: No itching, burning, rashes, or lesions  LYMPH NODES: No enlarged glands ENDOCRINE: No heat or cold intolerance; No hair loss; No polydipsia or polyuria MUSCULOSKELETAL: No joint pain or swelling; No muscle, back, or extremity pain PSYCHIATRIC: No depression, anxiety, mood swings, or difficulty sleeping HEME/LYMPH: No easy bruising, or bleeding gums ALLERGY AND IMMUNOLOGIC: No hives or eczema	    Allergies : No Known Allergies    Intolerances    MEDICATIONS  (STANDING):  ALBUTerol/ipratropium for Nebulization 3milliLiter(s) Nebulizer every 6 hours  buDESOnide 160 MICROgram(s)/formoterol 4.5 MICROgram(s) Inhaler 1Puff(s) Inhalation two times a day  tiotropium 18 MICROgram(s) Capsule 1Capsule(s) Inhalation daily  methylPREDNISolone sodium succinate Injectable 40milliGRAM(s) IV Push every 8 hours  levoFLOXacin IVPB 500milliGRAM(s) IV Intermittent every 24 hours  heparin  Injectable 5000Unit(s) SubCutaneous every 12 hours  sodium chloride 0.9%. 1000milliLiter(s) IV Continuous <Continuous>  gabapentin 300milliGRAM(s) Oral three times a day  ALPRAZolam 0.25milliGRAM(s) Oral daily  famotidine Injectable 20milliGRAM(s) IV Push every 12 hours  ferrous    sulfate 325milliGRAM(s) Oral daily  polyethylene glycol 3350 17Gram(s) Oral daily  montelukast 10milliGRAM(s) Oral daily  fluticasone propionate 50 MICROgram(s)/spray Nasal Spray 1Spray(s) Both Nostrils daily  artificial tears (preservative free) Ophthalmic Solution 1Drop(s) Both EYES three times a day  calcium carbonate  625 mG + Vitamin D (OsCal 250 + D) 1Tablet(s) Oral two times a day    MEDICATIONS  (PRN):  acetaminophen   Tablet. 650milliGRAM(s) Oral every 6 hours PRN Mild Pain (1 - 3)  oxyCODONE IR 5milliGRAM(s) Oral every 6 hours PRN Moderate Pain (4 - 6)    ICU Vital Signs Last 24 Hrs  T(C): 36.8, Max: 37.1 (06-11 @ 23:56)  T(F): 98.3, Max: 98.8 (06-11 @ 23:56)  HR: 80 (66 - 97)  BP: 203/80 (128/91 - 203/80)  BP(mean): 117 (84 - 129)  ABP: --  ABP(mean): --  RR: 20 (11 - 25)  SpO2: 96% (94% - 100%)      PHYSICAL EXAM:  GENERAL: On BiPAP, well-groomed, well-developed HEAD:  Atraumatic, Normocephalic EYES: EOMI, PERRLA, conjunctiva and sclera clear ENMT: No tonsillar erythema, exudates, or enlargement; Moist mucous membranes, Good dentition, No lesions NECK: Supple, No JVD, Normal thyroid CHEST/LUNG: B/L decreased breath sounds noted.  HEART: Regular rate and rhythm; No murmurs, rubs, or gallops ABDOMEN: Soft, Nontender, Nondistended; Bowel sounds present EXTREMITIES:  2+ Peripheral Pulses, No clubbing, cyanosis, or edema MS: No joint swelling or deformity.  LYMPH: No lymphadenopathy noted SKIN: No rashes or lesions NERVOUS SYSTEM:  Motor Strength 3/5 B/L upper and lower extremities; No gross focal deficit noted.  PSYCH:  sleepy but wakes up on calling her name. Oriented to place and person. .	         LABS:                               11.2   6.9   )-----------( 160      ( 12 Jun 2017 06:33 )             31.9     12 Jun 2017 06:33    140    |  104    |  15     ----------------------------<  146    3.8     |  33     |  0.56     Ca    7.7        12 Jun 2017 06:33  Phos  3.0       11 Jun 2017 06:50  Mg     2.4       12 Jun 2017 06:33    TPro  5.8    /  Alb  2.9    /  TBili  0.3    /  DBili  x      /  AST  30     /  ALT  23     /  AlkPhos  46     12 Jun 2017 06:33    06-11 [id number] 5.4    06-11 Chol 229<H> <H> HDL 58 Trig 127    Thyroid    Creatine Kinase, Serum: 104 U/L (06-10 @ 02:13)    Troponin I, Serum: .000 ng/mL (06-10 @ 02:13)    Culture Results:   Normal Urogenital fanny present (06-10 @ 12:28)  Culture Results:   No growth to date. (06-10 @ 11:40)  Culture Results:   No growth to date. (06-10 @ 11:40)        RADIOLOGY TEST: (IMAGES REVIEWED BY ME)  EXAM:  CT CHEST                          PROCEDURE DATE:  06/12/2017      INTERPRETATION:  Clinical information: COPD, difficulty breathing    No prior studies present for comparison    Axial images obtained, coronal and sagittal images computer reformatted.   Intravenous contrast material not administered limiting evaluation of   hilar and mediastinal regions.    Evidence of central lobular emphysema. Parenchymal scarring right   posterior apex. Bilateral pleural thickening posterioraspect both upper   lobes. Bullous disease present most prominent at the right lung base.   Calcified granulomas present multiple locations. No vascular congestion.   No pneumothorax. No pleural effusion.    Minimal atelectasis /scarring, left lung base. Central airway intact.   Thyroid gland not enlarged. Minimal anterior pericardial thickening or   small localized pericardial effusion. Coronary artery calcifications   present. Small anterior mediastinal soft tissue density measures in the   cystic range. No hilar lesions identified, evaluation limited due to lack   of IV contrast. No thoracic aortic aneurysm.    No adrenal lesions. The spleen is not enlarged. Hypodensities in the   right and left hepatic lobes have the appearance of cysts.   Postcholecystectomy clips present. No acute osseous abnormalities.   Bilateral rib fractures with callus formation.    IMPRESSION: Centrilobular emphysema. See additional findings as described   above.  Imaging Personally Reviewed:  [X] YES        HEALTH ISSUES - PROBLEM Dx:  Acute respiratory failure with hypercapnia: Acute respiratory failure with hypercapnia  Need for prophylactic measure: Need for prophylactic measure  Depression, unspecified depression type: Depression, unspecified depression type  Osteoporosis, unspecified osteoporosis type, unspecified pathological fracture presence: Osteoporosis, unspecified osteoporosis type, unspecified pathological fracture presence  Anxiety: Anxiety  Gastroesophageal reflux disease, esophagitis presence not specified: Gastroesophageal reflux disease, esophagitis presence not specified  Acute on chronic respiratory failure with hypercapnia: Acute on chronic respiratory failure with hypercapnia  GERD (gastroesophageal reflux disease): GERD (gastroesophageal reflux disease)  Depression: Depression  COPD exacerbation: COPD exacerbation  Acute respiratory failure: Acute respiratory failure

## 2017-06-12 NOTE — SWALLOW BEDSIDE ASSESSMENT ADULT - SWALLOW EVAL: RECOMMENDED FEEDING/EATING TECHNIQUES
allow for swallow between intakes/check mouth frequently for oral residue/pocketing/maintain upright posture during/after eating for 30 mins/oral hygiene/position upright (90 degrees)/small sips/bites

## 2017-06-12 NOTE — SWALLOW BEDSIDE ASSESSMENT ADULT - ASR SWALLOW ASPIRATION MONITOR
change of breathing pattern/oral hygiene/position upright (90Y)/cough/gurgly voice/fever/pneumonia/throat clearing/upper respiratory infection

## 2017-06-12 NOTE — PROGRESS NOTE ADULT - SUBJECTIVE AND OBJECTIVE BOX
PULMONARY/CRITICAL CARE      INTERVAL HPI/OVERNIGHT EVENTS:  Still wheezing. Alert and conversant. Using bipap most of the time.    71y FemaleHPI:  71 years old female with history of COPD, Anemia, history of respiratory failure, anxiety, who was at [facility]. Patient was having increasing shortness of breath. She was sent in to ER for same. Patient was in respiratory distress and was placed  on BiPAP. She is admitted for further management.   Patient is currently on BiPAP and continues to c/o shortness of breath.   She denies any chest pain or pressure.  No nausea or vomiting.   No fever or chills.   No dizziness or syncope. (10 Rashawn 2017 14:32)        PAST MEDICAL & SURGICAL HISTORY:  Osteoporosis  Insomnia, unspecified type  Anxiety  Herpes zoster with other complication: Left arm.  Depression  Neuropathy  GERD (gastroesophageal reflux disease)  Chronic obstructive pulmonary disease, unspecified COPD type  Chronic obstructive pulmonary disease with acute lower respiratory infection  No significant past surgical history        ICU Vital Signs Last 24 Hrs  T(C): 36.8, Max: 37.1 (06-11 @ 08:32)  T(F): 98.2, Max: 98.8 (06-11 @ 23:56)  HR: 86 (66 - 105)  BP: 167/91 (128/91 - 173/91)  BP(mean): 113 (87 - 116)  ABP: --  ABP(mean): --  RR: 25 (11 - 25)  SpO2: 96% (92% - 100%)    Qtts:     I&O's Summary    I & Os for current day (as of 12 Jun 2017 07:18)  =============================================  IN: 2120 ml / OUT: 440 ml / NET: 1680 ml          REVIEW OF SYSTEMS:    CONSTITUTIONAL: No fever, weight loss, or fatigue  EYES: No eye pain, visual disturbances, or discharge  ENMT:  No difficulty hearing, tinnitus, vertigo; No sinus or throat pain  NECK: No pain or stiffness  BREASTS: No pain, masses, or nipple discharge  RESPIRATORY: No cough, wheezing, chills or hemoptysis; mild shortness of breath  CARDIOVASCULAR: No chest pain, palpitations, dizziness, or leg swelling  GASTROINTESTINAL: No abdominal or epigastric pain. No nausea, vomiting, or hematemesis; No diarrhea or constipation. No melena or hematochezia.  GENITOURINARY: No dysuria, frequency, hematuria, or incontinence  NEUROLOGICAL: No headaches, memory loss, loss of strength, numbness, or tremors  SKIN: No itching, burning, rashes, or lesions   LYMPH NODES: No enlarged glands  ENDOCRINE: No heat or cold intolerance; No hair loss  MUSCULOSKELETAL: No joint pain or swelling; No muscle, back, or extremity pain, no calf tenderness  PSYCHIATRIC: No depression, anxiety, mood swings, or difficulty sleeping  HEME/LYMPH: No easy bruising, or bleeding gums  ALLERGY AND IMMUNOLOGIC: No hives or eczema      PHYSICAL EXAM:    GENERAL: NAD, well-groomed, well-developed, NAD on bipap  HEAD:  Atraumatic, Normocephalic  EYES: EOMI, PERRLA, conjunctiva and sclera clear  ENMT: No tonsillar erythema, exudates, or enlargement; Moist mucous membranes, Good dentition, No lesions  NECK: Supple, No JVD, Normal thyroid  NERVOUS SYSTEM:  Alert & Oriented X3, Good concentration; Motor Strength 5/5 B/L upper and lower extremities  CHEST/LUNG: Few wheezes bilat. Good excursion. No change percussion, fremitus  HEART: Regular rate and rhythm; No murmurs, rubs, or gallops  ABDOMEN: Soft, Nontender, Nondistended; Bowel sounds present  EXTREMITIES:  2+ Peripheral Pulses, No clubbing, cyanosis, or edema  LYMPH: No lymphadenopathy noted  SKIN: No rashes or lesions      LABS:                        11.2   6.9   )-----------( 160      ( 12 Jun 2017 06:33 )             31.9     06-12    140  |  104  |  15  ----------------------------<  146<H>  3.8   |  33<H>  |  0.56    Ca    7.7<L>      12 Jun 2017 06:33  Phos  3.0     06-11  Mg     2.1     06-11    TPro  5.8<L>  /  Alb  2.9<L>  /  TBili  0.3  /  DBili  x   /  AST  30  /  ALT  23  /  AlkPhos  46  06-12        ABG - ( 11 Jun 2017 06:36 )  pH: x     /  pCO2: 62    /  pO2: 80    / HCO3: 21    / Base Excess: -2.2  /  SaO2: 96                vanco through     RADIOLOGY & ADDITIONAL STUDIES:      CRITICAL CARE TIME SPENT:

## 2017-06-13 LAB
ANION GAP SERPL CALC-SCNC: 3 MMOL/L — LOW (ref 5–17)
BUN SERPL-MCNC: 17 MG/DL — SIGNIFICANT CHANGE UP (ref 7–23)
CALCIUM SERPL-MCNC: 7.7 MG/DL — LOW (ref 8.4–10.5)
CHLORIDE SERPL-SCNC: 108 MMOL/L — SIGNIFICANT CHANGE UP (ref 96–108)
CO2 SERPL-SCNC: 35 MMOL/L — HIGH (ref 22–31)
CREAT SERPL-MCNC: 0.56 MG/DL — SIGNIFICANT CHANGE UP (ref 0.5–1.3)
FOLATE SERPL-MCNC: 10.1 NG/ML — SIGNIFICANT CHANGE UP (ref 4.8–24.2)
GLUCOSE SERPL-MCNC: 138 MG/DL — HIGH (ref 70–99)
HCT VFR BLD CALC: 32.6 % — LOW (ref 34.5–45)
HGB BLD-MCNC: 10.9 G/DL — LOW (ref 11.5–15.5)
IRON SATN MFR SERPL: 96 UG/DL — SIGNIFICANT CHANGE UP (ref 30–160)
MCHC RBC-ENTMCNC: 31.6 PG — SIGNIFICANT CHANGE UP (ref 27–34)
MCHC RBC-ENTMCNC: 33.3 GM/DL — SIGNIFICANT CHANGE UP (ref 32–36)
MCV RBC AUTO: 95 FL — SIGNIFICANT CHANGE UP (ref 80–100)
PLATELET # BLD AUTO: 151 K/UL — SIGNIFICANT CHANGE UP (ref 150–400)
POTASSIUM SERPL-MCNC: 3.8 MMOL/L — SIGNIFICANT CHANGE UP (ref 3.5–5.3)
POTASSIUM SERPL-SCNC: 3.8 MMOL/L — SIGNIFICANT CHANGE UP (ref 3.5–5.3)
RBC # BLD: 3.43 M/UL — LOW (ref 3.8–5.2)
RBC # FLD: 12.9 % — SIGNIFICANT CHANGE UP (ref 10.3–14.5)
SODIUM SERPL-SCNC: 146 MMOL/L — HIGH (ref 135–145)
VIT B12 SERPL-MCNC: 416 PG/ML — SIGNIFICANT CHANGE UP (ref 243–894)
WBC # BLD: 5.2 K/UL — SIGNIFICANT CHANGE UP (ref 3.8–10.5)
WBC # FLD AUTO: 5.2 K/UL — SIGNIFICANT CHANGE UP (ref 3.8–10.5)

## 2017-06-13 RX ORDER — FAMOTIDINE 10 MG/ML
20 INJECTION INTRAVENOUS
Refills: 0 | Status: DISCONTINUED | OUTPATIENT
Start: 2017-06-13 | End: 2017-06-19

## 2017-06-13 RX ADMIN — FAMOTIDINE 20 MILLIGRAM(S): 10 INJECTION INTRAVENOUS at 05:51

## 2017-06-13 RX ADMIN — Medication 1 SPRAY(S): at 12:43

## 2017-06-13 RX ADMIN — Medication 0.25 MILLIGRAM(S): at 22:03

## 2017-06-13 RX ADMIN — Medication 1 DROP(S): at 13:01

## 2017-06-13 RX ADMIN — HEPARIN SODIUM 5000 UNIT(S): 5000 INJECTION INTRAVENOUS; SUBCUTANEOUS at 05:51

## 2017-06-13 RX ADMIN — MONTELUKAST 10 MILLIGRAM(S): 4 TABLET, CHEWABLE ORAL at 12:43

## 2017-06-13 RX ADMIN — OXYCODONE HYDROCHLORIDE 5 MILLIGRAM(S): 5 TABLET ORAL at 22:11

## 2017-06-13 RX ADMIN — POLYETHYLENE GLYCOL 3350 17 GRAM(S): 17 POWDER, FOR SOLUTION ORAL at 12:43

## 2017-06-13 RX ADMIN — HEPARIN SODIUM 5000 UNIT(S): 5000 INJECTION INTRAVENOUS; SUBCUTANEOUS at 17:37

## 2017-06-13 RX ADMIN — Medication 0.25 MILLIGRAM(S): at 13:02

## 2017-06-13 RX ADMIN — BUDESONIDE AND FORMOTEROL FUMARATE DIHYDRATE 1 PUFF(S): 160; 4.5 AEROSOL RESPIRATORY (INHALATION) at 07:48

## 2017-06-13 RX ADMIN — OXYCODONE HYDROCHLORIDE 5 MILLIGRAM(S): 5 TABLET ORAL at 22:30

## 2017-06-13 RX ADMIN — Medication 1 DROP(S): at 05:51

## 2017-06-13 RX ADMIN — Medication 1 DROP(S): at 22:03

## 2017-06-13 RX ADMIN — Medication 40 MILLIGRAM(S): at 13:01

## 2017-06-13 RX ADMIN — Medication 0.25 MILLIGRAM(S): at 05:51

## 2017-06-13 RX ADMIN — GABAPENTIN 300 MILLIGRAM(S): 400 CAPSULE ORAL at 22:03

## 2017-06-13 RX ADMIN — Medication 40 MILLIGRAM(S): at 22:03

## 2017-06-13 RX ADMIN — Medication 3 MILLILITER(S): at 13:31

## 2017-06-13 RX ADMIN — GABAPENTIN 300 MILLIGRAM(S): 400 CAPSULE ORAL at 13:01

## 2017-06-13 RX ADMIN — Medication 3 MILLILITER(S): at 07:34

## 2017-06-13 RX ADMIN — GABAPENTIN 300 MILLIGRAM(S): 400 CAPSULE ORAL at 05:51

## 2017-06-13 RX ADMIN — Medication 325 MILLIGRAM(S): at 12:43

## 2017-06-13 RX ADMIN — Medication 3 MILLILITER(S): at 20:07

## 2017-06-13 RX ADMIN — BUDESONIDE AND FORMOTEROL FUMARATE DIHYDRATE 1 PUFF(S): 160; 4.5 AEROSOL RESPIRATORY (INHALATION) at 20:00

## 2017-06-13 RX ADMIN — TIOTROPIUM BROMIDE 1 CAPSULE(S): 18 CAPSULE ORAL; RESPIRATORY (INHALATION) at 06:49

## 2017-06-13 RX ADMIN — Medication 1 TABLET(S): at 17:37

## 2017-06-13 RX ADMIN — SODIUM CHLORIDE 75 MILLILITER(S): 9 INJECTION INTRAMUSCULAR; INTRAVENOUS; SUBCUTANEOUS at 03:52

## 2017-06-13 RX ADMIN — Medication 40 MILLIGRAM(S): at 05:51

## 2017-06-13 RX ADMIN — Medication 1 TABLET(S): at 05:52

## 2017-06-13 RX ADMIN — FAMOTIDINE 20 MILLIGRAM(S): 10 INJECTION INTRAVENOUS at 17:37

## 2017-06-13 NOTE — PROGRESS NOTE ADULT - SUBJECTIVE AND OBJECTIVE BOX
71y  Female  No Known Allergies    CC:vPatient is a 71y old  Female who presented with a chief complaint of Shortness of breath     HPI:  71 years old female with history of COPD, Anemia, past respiratory failure, anxiety was having increasing shortness of breath, sent in to ER. Patient was in respiratory distress, placed on BiPAP and was admitted for management of acute hypercapnic respiratory failure. Patient is currently on BiPAP this evening  but today showed improvement off BIPAP for a few hours going back on at night tonight. Currently no shortness of breath on the BIPAP.       PAST MEDICAL & SURGICAL HISTORY:  Osteoporosis  Insomnia, unspecified type  Anxiety  Herpes zoster with other complication: Left arm.  Depression  Neuropathy  GERD (gastroesophageal reflux disease)  Chronic obstructive pulmonary disease, unspecified COPD type  Chronic obstructive pulmonary disease with acute lower respiratory infection  No significant past surgical history    FAMILY HISTORY:  No pertinent family history in first degree relatives      Vitals   Vital Signs Last 24 Hrs  T(C): 36.8, Max: 37.2 (06-13 @ 07:59)  T(F): 98.3, Max: 98.9 (06-13 @ 07:59)  HR: 75 (62 - 95)  BP: 177/86 (122/62 - 177/86)  BP(mean): 109 (80 - 116)  RR: 22 (11 - 22)  SpO2: 98% (95% - 100%)  ABG - ( 12 Jun 2017 08:01 )  pH: x     /  pCO2: 45    /  pO2: 126   / HCO3: 27    / Base Excess: 2.6   /  SaO2: 99                I&O's Summary  I & Os for 24h ending 13 Jun 2017 07:00  =============================================  IN: 1595 ml / OUT: 550 ml / NET: 1045 ml    I & Os for current day (as of 13 Jun 2017 21:15)  =============================================  IN: 1235 ml / OUT: 900 ml / NET: 335 ml      LABS  06-13    146<H>  |  108  |  17  ----------------------------<  138<H>  3.8   |  35<H>  |  0.56    Ca    7.7<L>      13 Jun 2017 06:32  Mg     2.4     06-12    TPro  5.8<L>  /  Alb  2.9<L>  /  TBili  0.3  /  DBili  x   /  AST  30  /  ALT  23  /  AlkPhos  46  06-12                          10.9   5.2   )-----------( 151      ( 13 Jun 2017 06:32 )             32.6           LIVER FUNCTIONS - ( 12 Jun 2017 06:33 )  Alb: 2.9 g/dL / Pro: 5.8 g/dL / ALK PHOS: 46 U/L / ALT: 23 U/L DA / AST: 30 U/L / GGT: x           CAPILLARY BLOOD GLUCOSE        VENT SETTINGS       Meds  MEDICATIONS  (STANDING):  ALBUTerol/ipratropium for Nebulization 3milliLiter(s) Nebulizer every 6 hours  buDESOnide 160 MICROgram(s)/formoterol 4.5 MICROgram(s) Inhaler 1Puff(s) Inhalation two times a day  tiotropium 18 MICROgram(s) Capsule 1Capsule(s) Inhalation daily  methylPREDNISolone sodium succinate Injectable 40milliGRAM(s) IV Push every 8 hours  heparin  Injectable 5000Unit(s) SubCutaneous every 12 hours  gabapentin 300milliGRAM(s) Oral three times a day  ferrous    sulfate 325milliGRAM(s) Oral daily  polyethylene glycol 3350 17Gram(s) Oral daily  montelukast 10milliGRAM(s) Oral daily  fluticasone propionate 50 MICROgram(s)/spray Nasal Spray 1Spray(s) Both Nostrils daily  artificial tears (preservative free) Ophthalmic Solution 1Drop(s) Both EYES three times a day  calcium carbonate  625 mG + Vitamin D (OsCal 250 + D) 1Tablet(s) Oral two times a day  ALPRAZolam 0.25milliGRAM(s) Oral three times a day  famotidine    Tablet 20milliGRAM(s) Oral two times a day      REVIEW OF SYSTEMS:    CONSTITUTIONAL: No fever, weight loss, or fatigue  EYES: No eye pain, visual disturbances, or discharge  ENMT:  No difficulty hearing, tinnitus, vertigo; No sinus or throat pain  NECK: No pain or stiffness  BREASTS: No pain, masses, or nipple discharge  RESPIRATORY: No cough, wheezing, chills or hemoptysis; No shortness of breath  CARDIOVASCULAR: No chest pain, palpitations, dizziness, or leg swelling  GASTROINTESTINAL: No abdominal or epigastric pain. No nausea, vomiting, or hematemesis; No diarrhea or constipation. No melena or hematochezia.  GENITOURINARY: No dysuria, frequency, hematuria, or incontinence  NEUROLOGICAL: No headaches, memory loss, loss of strength, numbness, or tremors  SKIN: No itching, burning, rashes, or lesions   LYMPH NODES: No enlarged glands  ENDOCRINE: No heat or cold intolerance; No hair loss  MUSCULOSKELETAL: No joint pain or swelling; No muscle, back, or extremity pain  PSYCHIATRIC: No depression, anxiety, mood swings, or difficulty sleeping  HEME/LYMPH: No easy bruising, or bleeding gums  ALLERY AND IMMUNOLOGIC: No hives or eczema      Physicial Exam:     Constitutional: NAD, well-groomed, well-developed, on BIPAP and comfortably breathing   HEENT: PERRLA, EOMI, no drainage or redness  Neck: No bruits; no thyromegaly or nodules,  No JVD  Respiratory: Breath Sounds equal & clear to percussion & auscultation, no accessory muscle use  Cardiovascular: Regular rate & rhythm, normal S1, S2; no murmurs, gallops or rubs; no S3, S4  Gastrointestinal: Soft, non-tender, non distended no hepatosplenomegaly, normal bowel sounds  Extremities: No peripheral edema, No cyanosis, clubbing   Vascular: Equal and normal pulses: 2+ peripheral pulses throughout  Neurological:   A&O x 3; no sensory, motor  deficits, normal reflexes  Musculoskeletal: No joint pain, swelling or deformity; no limitation of movement  Skin: No rashes

## 2017-06-13 NOTE — PROGRESS NOTE ADULT - ASSESSMENT
Pt with O2 dependent COPD c/o increasing sob for few days .  Acute Hypercarbic respiratory failure , now on bipap. No evidence bacterial infection. Still wheezing, but significantly less hypercarbic.  Bullous emphysema.

## 2017-06-13 NOTE — PROGRESS NOTE ADULT - ASSESSMENT
71 year old femal admitted with  Acute on chronic respiratory failure with hypercapnia with hypercapnia secondary to  COPD exacerbation currently on BIPAP, IV steroids and bronchodilators. 71 year old femal admitted with  Acute on chronic respiratory failure with hypercapnia with hypercapnia secondary to  COPD exacerbation currently on BIPAP, IV steroids and bronchodilators.      Patient Care time: 30 mins, Assesing patient , reviewing data, imaging, discussing with multidisciplinary team, non inclusive of procedures, discussing goals of care with patient/family

## 2017-06-13 NOTE — PROGRESS NOTE ADULT - SUBJECTIVE AND OBJECTIVE BOX
PULMONARY/CRITICAL CARE      INTERVAL HPI/OVERNIGHT EVENTS: Still sob off bipap. Alert, nad. Slight cough, wheeze    71y FemaleHPI:  71 years old female with history of COPD, Anemia, history of respiratory failure, anxiety, who was at [facility]. Patient was having increasing shortness of breath. She was sent in to ER for same. Patient was in respiratory distress and was placed  on BiPAP. She is admitted for further management.   Patient is currently on BiPAP and continues to c/o shortness of breath.   She denies any chest pain or pressure.  No nausea or vomiting.   No fever or chills.   No dizziness or syncope. (10 Rashawn 2017 14:32)        PAST MEDICAL & SURGICAL HISTORY:  Osteoporosis  Insomnia, unspecified type  Anxiety  Herpes zoster with other complication: Left arm.  Depression  Neuropathy  GERD (gastroesophageal reflux disease)  Chronic obstructive pulmonary disease, unspecified COPD type  Chronic obstructive pulmonary disease with acute lower respiratory infection  No significant past surgical history        ICU Vital Signs Last 24 Hrs  T(C): 36.6, Max: 37.1 (06-13 @ 00:31)  T(F): 97.9, Max: 98.7 (06-13 @ 00:31)  HR: 64 (64 - 99)  BP: 149/65 (122/62 - 203/80)  BP(mean): 91 (80 - 129)  ABP: --  ABP(mean): --  RR: 13 (11 - 22)  SpO2: 97% (94% - 100%)    Qtts:     I&O's Summary  I & Os for 24h ending 12 Jun 2017 07:00  =============================================  IN: 2120 ml / OUT: 440 ml / NET: 1680 ml    I & Os for current day (as of 13 Jun 2017 06:17)  =============================================  IN: 1520 ml / OUT: 550 ml / NET: 970 ml        REVIEW OF SYSTEMS:    CONSTITUTIONAL: No fever, weight loss, or fatigue  EYES: No eye pain, visual disturbances, or discharge  ENMT:  No difficulty hearing, tinnitus, vertigo; No sinus or throat pain  NECK: No pain or stiffness  BREASTS: No pain, masses, or nipple discharge  RESPIRATORY: No cough, wheezing, chills or hemoptysis; mild shortness of breath  CARDIOVASCULAR: No chest pain, palpitations, dizziness, or leg swelling  GASTROINTESTINAL: No abdominal or epigastric pain. No nausea, vomiting, or hematemesis; No diarrhea or constipation. No melena or hematochezia.  GENITOURINARY: No dysuria, frequency, hematuria, or incontinence  NEUROLOGICAL: No headaches, memory loss, loss of strength, numbness, or tremors  SKIN: No itching, burning, rashes, or lesions   LYMPH NODES: No enlarged glands  ENDOCRINE: No heat or cold intolerance; No hair loss  MUSCULOSKELETAL: No joint pain or swelling; No muscle, back, or extremity pain, no calf tenderness  PSYCHIATRIC: No depression, anxiety, mood swings, or difficulty sleeping  HEME/LYMPH: No easy bruising, or bleeding gums  ALLERGY AND IMMUNOLOGIC: No hives or eczema      PHYSICAL EXAM:    GENERAL: NAD, well-groomed, well-developed, NAD on bipap  HEAD:  Atraumatic, Normocephalic  EYES: EOMI, PERRLA, conjunctiva and sclera clear  ENMT: No tonsillar erythema, exudates, or enlargement; Moist mucous membranes, Good dentition, No lesions  NECK: Supple, No JVD, Normal thyroid  NERVOUS SYSTEM:  Alert & Oriented X3, Good concentration; Motor Strength 5/5 B/L upper and lower extremities  CHEST/LUNG: Few wheezes bilat. Good excursion. No change percussion, fremitus  HEART: Regular rate and rhythm; No murmurs, rubs, or gallops  ABDOMEN: Soft, Nontender, Nondistended; Bowel sounds present  EXTREMITIES:  2+ Peripheral Pulses, No clubbing, cyanosis, or edema  LYMPH: No lymphadenopathy noted  SKIN: No rashes or lesions              LABS:                        11.2   6.9   )-----------( 160      ( 12 Jun 2017 06:33 )             31.9     06-12    140  |  104  |  15  ----------------------------<  146<H>  3.8   |  33<H>  |  0.56    Ca    7.7<L>      12 Jun 2017 06:33  Phos  3.0     06-11  Mg     2.4     06-12    TPro  5.8<L>  /  Alb  2.9<L>  /  TBili  0.3  /  DBili  x   /  AST  30  /  ALT  23  /  AlkPhos  46  06-12        ABG - ( 12 Jun 2017 08:01 )  pH: x     /  pCO2: 45    /  pO2: 126   / HCO3: 27    / Base Excess: 2.6   /  SaO2: 99                vanco through     RADIOLOGY & ADDITIONAL STUDIES:  EXAM: CT CHEST            PROCEDURE DATE: 06/12/2017      INTERPRETATION: Clinical information: COPD, difficulty breathing    No prior studies present for comparison    Axial images obtained, coronal and sagittal images computer reformatted.  Intravenous contrast material not administered limiting evaluation of hilar  and mediastinal regions.    Evidence of central lobular emphysema. Parenchymal scarring right posterior  apex. Bilateral pleural thickening posterior aspect both upper lobes.  Bullous disease present most prominent at the right lung base. Calcified  granulomas present multiple locations. No vascular congestion. No  pneumothorax. No pleural effusion.    Minimal atelectasis /scarring, left lung base. Central airway intact.  Thyroid gland not enlarged. Minimal anterior pericardial thickening or small  localized pericardial effusion. Coronary artery calcifications present.  Small anterior mediastinal soft tissue density measures in the cystic range.  No hilar lesions identified, evaluation limited due to lack of IV contrast.  No thoracic aortic aneurysm.    No adrenal lesions. The spleen is not enlarged. Hypodensities in the right  and left hepatic lobes have the appearance of cysts. Postcholecystectomy  clips present. No acute osseous abnormalities. Bilateral rib fractures with  callus formation.    IMPRESSION: Centrilobular emphysema. See additional findings as described  above.                CRITICAL CARE TIME SPENT:

## 2017-06-13 NOTE — PROGRESS NOTE ADULT - SUBJECTIVE AND OBJECTIVE BOX
Patient is a 71y old  Female who presents with a chief complaint of Shortness of breath (10 Rashawn 2017 14:32)    HPI:  71 years old female with history of COPD, Anemia, history of respiratory failure, anxiety, who was at [facility]. Patient was having increasing shortness of breath. She was sent in to ER for same. Patient was in respiratory distress and was placed  on BiPAP. She is admitted for further management.   Patient is currently on BiPAP and continues to c/o shortness of breath.   She denies any chest pain or pressure.  No nausea or vomiting.   No fever or chills.   No dizziness or syncope. (10 Rashawn 2017 14:32)    INTERVAL HPI/OVERNIGHT EVENTS:  Chart reviewed, notes reviewed.   Patient seen and examined.  Being followed by following specialists: Pulmonary    Consultant(s) Notes Reviewed:  [X] Yes    Care Discussed with Consultants/Other Providers: [X] Yes  Still on BiPAP most of the time.   Doing better.  Anxiety is better on increased dose of Xanax. .   No chest pain or pressure.   No fever or chills.     REVIEW OF SYSTEMS:  CONSTITUTIONAL: No fever, weight loss, or fatigue EYES: No eye pain, visual disturbances, or discharge ENMT:  No difficulty hearing, tinnitus, vertigo; No sinus or throat pain NECK: No pain or stiffness BREASTS: No pain, masses, or nipple discharge RESPIRATORY: + shortness of breath CARDIOVASCULAR: No chest pain, palpitations, dizziness, or leg swelling GASTROINTESTINAL: No abdominal or epigastric pain. No nausea, vomiting, or hematemesis; No diarrhea or constipation. No melena or hematochezia. GENITOURINARY: No dysuria, frequency, hematuria, or incontinence NEUROLOGICAL: No headaches, memory loss, loss of strength, numbness, or tremors SKIN: No itching, burning, rashes, or lesions  LYMPH NODES: No enlarged glands ENDOCRINE: No heat or cold intolerance; No hair loss; No polydipsia or polyuria MUSCULOSKELETAL: No joint pain or swelling; No muscle, back, or extremity pain PSYCHIATRIC: No depression, anxiety, mood swings, or difficulty sleeping HEME/LYMPH: No easy bruising, or bleeding gums ALLERGY AND IMMUNOLOGIC: No hives or eczema	    Allergies : No Known Allergies    Intolerances    MEDICATIONS  (STANDING):  ALBUTerol/ipratropium for Nebulization 3milliLiter(s) Nebulizer every 6 hours  buDESOnide 160 MICROgram(s)/formoterol 4.5 MICROgram(s) Inhaler 1Puff(s) Inhalation two times a day  tiotropium 18 MICROgram(s) Capsule 1Capsule(s) Inhalation daily  methylPREDNISolone sodium succinate Injectable 40milliGRAM(s) IV Push every 8 hours  heparin  Injectable 5000Unit(s) SubCutaneous every 12 hours  gabapentin 300milliGRAM(s) Oral three times a day  ferrous    sulfate 325milliGRAM(s) Oral daily  polyethylene glycol 3350 17Gram(s) Oral daily  montelukast 10milliGRAM(s) Oral daily  fluticasone propionate 50 MICROgram(s)/spray Nasal Spray 1Spray(s) Both Nostrils daily  artificial tears (preservative free) Ophthalmic Solution 1Drop(s) Both EYES three times a day  calcium carbonate  625 mG + Vitamin D (OsCal 250 + D) 1Tablet(s) Oral two times a day  ALPRAZolam 0.25milliGRAM(s) Oral three times a day  famotidine    Tablet 20milliGRAM(s) Oral two times a day    MEDICATIONS  (PRN):  acetaminophen   Tablet. 650milliGRAM(s) Oral every 6 hours PRN Mild Pain (1 - 3)  oxyCODONE IR 5milliGRAM(s) Oral every 6 hours PRN Moderate Pain (4 - 6)    ICU Vital Signs Last 24 Hrs  T(C): 36.8, Max: 37.2 (06-13 @ 07:59)  T(F): 98.3, Max: 98.9 (06-13 @ 07:59)  HR: 79 (62 - 99)  BP: 141/67 (122/62 - 176/70)  BP(mean): 88 (80 - 116)  ABP: --  ABP(mean): --  RR: 12 (11 - 19)  SpO2: 98% (95% - 100%)    PHYSICAL EXAM:  GENERAL: On BiPAP, well-groomed, well-developed HEAD:  Atraumatic, Normocephalic EYES: EOMI, PERRLA, conjunctiva and sclera clear ENMT: No tonsillar erythema, exudates, or enlargement; Moist mucous membranes, Good dentition, No lesions NECK: Supple, No JVD, Normal thyroid CHEST/LUNG: B/L decreased breath sounds noted.  HEART: Regular rate and rhythm; No murmurs, rubs, or gallops ABDOMEN: Soft, Nontender, Nondistended; Bowel sounds present EXTREMITIES:  2+ Peripheral Pulses, No clubbing, cyanosis, or edema MS: No joint swelling or deformity.  LYMPH: No lymphadenopathy noted SKIN: No rashes or lesions NERVOUS SYSTEM:  Motor Strength 3/5 B/L upper and lower extremities; No gross focal deficit noted.  PSYCH:  sleepy but wakes up on calling her name. Oriented to place and person. .	         LABS:                               10.9   5.2   )-----------( 151      ( 13 Jun 2017 06:32 )             32.6     13 Jun 2017 06:32    146    |  108    |  17     ----------------------------<  138    3.8     |  35     |  0.56     Ca    7.7        13 Jun 2017 06:32  Mg     2.4       12 Jun 2017 06:33    TPro  5.8    /  Alb  2.9    /  TBili  0.3    /  DBili  x      /  AST  30     /  ALT  23     /  AlkPhos  46     12 Jun 2017 06:33      06-11 [id number] 5.4    06-11 Chol 229<H> <H> HDL 58 Trig 127    Iron Total, Serum: 96 ug/dL (06-13 @ 12:06)    Creatine Kinase, Serum: 104 U/L (06-10 @ 02:13)    Troponin I, Serum: .000 ng/mL (06-10 @ 02:13)    Culture Results:   Normal Urogenital fanny present (06-10 @ 12:28)  Culture Results:   No growth to date. (06-10 @ 11:40)  Culture Results:   No growth to date. (06-10 @ 11:40)              RADIOLOGY TEST: (IMAGES REVIEWED BY ME)  EXAM:  CT CHEST                          PROCEDURE DATE:  06/12/2017      INTERPRETATION:  Clinical information: COPD, difficulty breathing    No prior studies present for comparison    Axial images obtained, coronal and sagittal images computer reformatted.   Intravenous contrast material not administered limiting evaluation of   hilar and mediastinal regions.    Evidence of central lobular emphysema. Parenchymal scarring right   posterior apex. Bilateral pleural thickening posterioraspect both upper   lobes. Bullous disease present most prominent at the right lung base.   Calcified granulomas present multiple locations. No vascular congestion.   No pneumothorax. No pleural effusion.    Minimal atelectasis /scarring, left lung base. Central airway intact.   Thyroid gland not enlarged. Minimal anterior pericardial thickening or   small localized pericardial effusion. Coronary artery calcifications   present. Small anterior mediastinal soft tissue density measures in the   cystic range. No hilar lesions identified, evaluation limited due to lack   of IV contrast. No thoracic aortic aneurysm.    No adrenal lesions. The spleen is not enlarged. Hypodensities in the   right and left hepatic lobes have the appearance of cysts.   Postcholecystectomy clips present. No acute osseous abnormalities.   Bilateral rib fractures with callus formation.    IMPRESSION: Centrilobular emphysema. See additional findings as described   above.  Imaging Personally Reviewed:  [X] YES        HEALTH ISSUES - PROBLEM Dx:  Acute respiratory failure with hypercapnia: Acute respiratory failure with hypercapnia  Need for prophylactic measure: Need for prophylactic measure  Depression, unspecified depression type: Depression, unspecified depression type  Osteoporosis, unspecified osteoporosis type, unspecified pathological fracture presence: Osteoporosis, unspecified osteoporosis type, unspecified pathological fracture presence  Anxiety: Anxiety  Gastroesophageal reflux disease, esophagitis presence not specified: Gastroesophageal reflux disease, esophagitis presence not specified  Acute on chronic respiratory failure with hypercapnia: Acute on chronic respiratory failure with hypercapnia  GERD (gastroesophageal reflux disease): GERD (gastroesophageal reflux disease)  Depression: Depression  COPD exacerbation: COPD exacerbation  Acute respiratory failure: Acute respiratory failure

## 2017-06-13 NOTE — PROGRESS NOTE ADULT - ASSESSMENT
71 years old female with history of COPD, Anemia, history of respiratory failure, anxiety, who was at [facility]. Patient was having increasing shortness of breath. She was sent in to ER for same. Patient was in respiratory distress and was placed  on BiPAP. She is found to have respiratory failure. She is being admitted for further care.

## 2017-06-14 LAB
ANION GAP SERPL CALC-SCNC: 2 MMOL/L — LOW (ref 5–17)
BUN SERPL-MCNC: 20 MG/DL — SIGNIFICANT CHANGE UP (ref 7–23)
CALCIUM SERPL-MCNC: 7.6 MG/DL — LOW (ref 8.4–10.5)
CHLORIDE SERPL-SCNC: 107 MMOL/L — SIGNIFICANT CHANGE UP (ref 96–108)
CO2 SERPL-SCNC: 37 MMOL/L — HIGH (ref 22–31)
CREAT SERPL-MCNC: 0.54 MG/DL — SIGNIFICANT CHANGE UP (ref 0.5–1.3)
GLUCOSE SERPL-MCNC: 146 MG/DL — HIGH (ref 70–99)
HCT VFR BLD CALC: 32.4 % — LOW (ref 34.5–45)
HGB BLD-MCNC: 11.4 G/DL — LOW (ref 11.5–15.5)
MCHC RBC-ENTMCNC: 33.6 PG — SIGNIFICANT CHANGE UP (ref 27–34)
MCHC RBC-ENTMCNC: 35.1 GM/DL — SIGNIFICANT CHANGE UP (ref 32–36)
MCV RBC AUTO: 95.8 FL — SIGNIFICANT CHANGE UP (ref 80–100)
PLATELET # BLD AUTO: 142 K/UL — LOW (ref 150–400)
POTASSIUM SERPL-MCNC: 4 MMOL/L — SIGNIFICANT CHANGE UP (ref 3.5–5.3)
POTASSIUM SERPL-SCNC: 4 MMOL/L — SIGNIFICANT CHANGE UP (ref 3.5–5.3)
RBC # BLD: 3.38 M/UL — LOW (ref 3.8–5.2)
RBC # FLD: 12.9 % — SIGNIFICANT CHANGE UP (ref 10.3–14.5)
SODIUM SERPL-SCNC: 146 MMOL/L — HIGH (ref 135–145)
WBC # BLD: 4.6 K/UL — SIGNIFICANT CHANGE UP (ref 3.8–10.5)
WBC # FLD AUTO: 4.6 K/UL — SIGNIFICANT CHANGE UP (ref 3.8–10.5)

## 2017-06-14 RX ADMIN — FAMOTIDINE 20 MILLIGRAM(S): 10 INJECTION INTRAVENOUS at 06:24

## 2017-06-14 RX ADMIN — OXYCODONE HYDROCHLORIDE 5 MILLIGRAM(S): 5 TABLET ORAL at 23:25

## 2017-06-14 RX ADMIN — Medication 0.25 MILLIGRAM(S): at 22:25

## 2017-06-14 RX ADMIN — Medication 1 TABLET(S): at 17:19

## 2017-06-14 RX ADMIN — BUDESONIDE AND FORMOTEROL FUMARATE DIHYDRATE 1 PUFF(S): 160; 4.5 AEROSOL RESPIRATORY (INHALATION) at 18:19

## 2017-06-14 RX ADMIN — FAMOTIDINE 20 MILLIGRAM(S): 10 INJECTION INTRAVENOUS at 17:19

## 2017-06-14 RX ADMIN — GABAPENTIN 300 MILLIGRAM(S): 400 CAPSULE ORAL at 22:25

## 2017-06-14 RX ADMIN — Medication 1 SPRAY(S): at 12:03

## 2017-06-14 RX ADMIN — MONTELUKAST 10 MILLIGRAM(S): 4 TABLET, CHEWABLE ORAL at 11:59

## 2017-06-14 RX ADMIN — Medication 1 DROP(S): at 22:25

## 2017-06-14 RX ADMIN — Medication 3 MILLILITER(S): at 13:24

## 2017-06-14 RX ADMIN — HEPARIN SODIUM 5000 UNIT(S): 5000 INJECTION INTRAVENOUS; SUBCUTANEOUS at 06:24

## 2017-06-14 RX ADMIN — GABAPENTIN 300 MILLIGRAM(S): 400 CAPSULE ORAL at 06:24

## 2017-06-14 RX ADMIN — Medication 3 MILLILITER(S): at 08:03

## 2017-06-14 RX ADMIN — Medication 3 MILLILITER(S): at 00:10

## 2017-06-14 RX ADMIN — GABAPENTIN 300 MILLIGRAM(S): 400 CAPSULE ORAL at 14:37

## 2017-06-14 RX ADMIN — Medication 40 MILLIGRAM(S): at 14:37

## 2017-06-14 RX ADMIN — Medication 0.25 MILLIGRAM(S): at 06:24

## 2017-06-14 RX ADMIN — Medication 40 MILLIGRAM(S): at 06:24

## 2017-06-14 RX ADMIN — OXYCODONE HYDROCHLORIDE 5 MILLIGRAM(S): 5 TABLET ORAL at 22:25

## 2017-06-14 RX ADMIN — HEPARIN SODIUM 5000 UNIT(S): 5000 INJECTION INTRAVENOUS; SUBCUTANEOUS at 17:19

## 2017-06-14 RX ADMIN — Medication 325 MILLIGRAM(S): at 11:59

## 2017-06-14 RX ADMIN — TIOTROPIUM BROMIDE 1 CAPSULE(S): 18 CAPSULE ORAL; RESPIRATORY (INHALATION) at 06:27

## 2017-06-14 RX ADMIN — Medication 40 MILLIGRAM(S): at 22:25

## 2017-06-14 RX ADMIN — POLYETHYLENE GLYCOL 3350 17 GRAM(S): 17 POWDER, FOR SOLUTION ORAL at 11:59

## 2017-06-14 RX ADMIN — Medication 1 TABLET(S): at 06:24

## 2017-06-14 RX ADMIN — Medication 0.25 MILLIGRAM(S): at 14:37

## 2017-06-14 RX ADMIN — Medication 3 MILLILITER(S): at 20:19

## 2017-06-14 RX ADMIN — Medication 1 DROP(S): at 06:24

## 2017-06-14 RX ADMIN — BUDESONIDE AND FORMOTEROL FUMARATE DIHYDRATE 1 PUFF(S): 160; 4.5 AEROSOL RESPIRATORY (INHALATION) at 06:25

## 2017-06-14 RX ADMIN — Medication 1 DROP(S): at 14:37

## 2017-06-14 NOTE — PROGRESS NOTE ADULT - SUBJECTIVE AND OBJECTIVE BOX
Patient is a 71y old  Female who presents with a chief complaint of Shortness of breath (10 Rashawn 2017 14:32)      INTERVAL HPI/OVERNIGHT EVENTS: Patient seen and examined. NAD. Feels about the same. Still mildly SOB and wheezing.      Vital Signs Last 24 Hrs  T(C): 36.5, Max: 36.8 (06-13 @ 16:03)  T(F): 97.7, Max: 98.3 (06-13 @ 16:03)  HR: 78 (59 - 95)  BP: 171/76 (130/70 - 182/83)  BP(mean): 105 (88 - 116)  RR: 14 (10 - 22)  SpO2: 99% (95% - 99%)I&O's Summary    I & Os for current day (as of 14 Jun 2017 10:24)  =============================================  IN: 1475 ml / OUT: 1250 ml / NET: 225 ml      LABS:                        11.4   4.6   )-----------( 142      ( 14 Jun 2017 06:45 )             32.4     06-14    146<H>  |  107  |  20  ----------------------------<  146<H>  4.0   |  37<H>  |  0.54    Ca    7.6<L>      14 Jun 2017 06:45          CAPILLARY BLOOD GLUCOSE    blood culture --  06-10 @ 12:28     urine culture --  06-10 @ 12:28  results   Normal Urogenital fanny present  blood culture --  06-10 @ 11:40     urine culture --  06-10 @ 11:40  results   No growth to date.        ALBUTerol/ipratropium for Nebulization 3milliLiter(s) Nebulizer every 6 hours  buDESOnide 160 MICROgram(s)/formoterol 4.5 MICROgram(s) Inhaler 1Puff(s) Inhalation two times a day  tiotropium 18 MICROgram(s) Capsule 1Capsule(s) Inhalation daily  methylPREDNISolone sodium succinate Injectable 40milliGRAM(s) IV Push every 8 hours  heparin  Injectable 5000Unit(s) SubCutaneous every 12 hours  acetaminophen   Tablet. 650milliGRAM(s) Oral every 6 hours PRN  oxyCODONE IR 5milliGRAM(s) Oral every 6 hours PRN  gabapentin 300milliGRAM(s) Oral three times a day  ferrous    sulfate 325milliGRAM(s) Oral daily  polyethylene glycol 3350 17Gram(s) Oral daily  montelukast 10milliGRAM(s) Oral daily  fluticasone propionate 50 MICROgram(s)/spray Nasal Spray 1Spray(s) Both Nostrils daily  artificial tears (preservative free) Ophthalmic Solution 1Drop(s) Both EYES three times a day  calcium carbonate  625 mG + Vitamin D (OsCal 250 + D) 1Tablet(s) Oral two times a day  ALPRAZolam 0.25milliGRAM(s) Oral three times a day  famotidine    Tablet 20milliGRAM(s) Oral two times a day      REVIEW OF SYSTEMS:  CONSTITUTIONAL: No fever, weight loss, or fatigue  NECK: No pain or stiffness  RESPIRATORY: + wheezing, No chills or hemoptysis; + shortness of breath  CARDIOVASCULAR: No chest pain, palpitations, dizziness, or leg swelling  GASTROINTESTINAL: No abdominal or epigastric pain. No nausea, vomiting, or hematemesis; No diarrhea or constipation. No melena or hematochezia.  GENITOURINARY: No dysuria, frequency, hematuria, or incontinence  NEUROLOGICAL: No headaches, loss of strength, numbness, or tremors  SKIN: No itching, burning  MUSCULOSKELETAL: No joint pain or swelling; No muscle, back, or extremity pain  PSYCHIATRIC: No depression, mood swings, HEME/LYMPH: No easy bruising, or bleeding gums  ALLERY AND IMMUNOLOGIC: No hives     RADIOLOGY & ADDITIONAL TESTS:    Imaging Personally Reviewed:  [ ] YES  [ ] NO    Consultant(s) Notes Reviewed:  [ x] YES  [ ] NO    PHYSICAL EXAM:  GENERAL: NAD, well-groomed, well-developed, on BIPAP  HEAD:  Atraumatic, Normocephalic  EYES: EOMI, PERRLA, conjunctiva and sclera clear  ENMT: No tonsillar erythema, exudates, or enlargement; Moist mucous membranes  NECK: Supple, No JVD  NERVOUS SYSTEM:  Awake & alert, Good concentration;   CHEST/LUNG: Clear to auscultation bilaterally; No rales, rhonchi, wheezing,  HEART: Regular rate and rhythm  ABDOMEN: Soft, Nontender, Nondistended; Bowel sounds present  EXTREMITIES:  No clubbing, cyanosis, or edema  LYMPH: No lymphadenopathy noted  SKIN: No rashes    Care Discussed with Consultants/Other Providers [ ] YES  [ ] NO    Advanced care planning discussed with patient/family [ ] YES   [ ] NO Patient is a 71y old  Female who presents with a chief complaint of Shortness of breath (10 Rashawn 2017 14:32)      INTERVAL HPI/OVERNIGHT EVENTS: Patient seen and examined. NAD. Feels about the same. Still mildly SOB and wheezing.      Vital Signs Last 24 Hrs  T(C): 36.5, Max: 36.8 (06-13 @ 16:03)  T(F): 97.7, Max: 98.3 (06-13 @ 16:03)  HR: 78 (59 - 95)  BP: 171/76 (130/70 - 182/83)  BP(mean): 105 (88 - 116)  RR: 14 (10 - 22)  SpO2: 99% (95% - 99%)I&O's Summary    I & Os for current day (as of 14 Jun 2017 10:24)  =============================================  IN: 1475 ml / OUT: 1250 ml / NET: 225 ml      LABS:                        11.4   4.6   )-----------( 142      ( 14 Jun 2017 06:45 )             32.4     06-14    146<H>  |  107  |  20  ----------------------------<  146<H>  4.0   |  37<H>  |  0.54    Ca    7.6<L>      14 Jun 2017 06:45          CAPILLARY BLOOD GLUCOSE    blood culture --  06-10 @ 12:28     urine culture --  06-10 @ 12:28  results   Normal Urogenital fanny present  blood culture --  06-10 @ 11:40     urine culture --  06-10 @ 11:40  results   No growth to date.        ALBUTerol/ipratropium for Nebulization 3milliLiter(s) Nebulizer every 6 hours  buDESOnide 160 MICROgram(s)/formoterol 4.5 MICROgram(s) Inhaler 1Puff(s) Inhalation two times a day  tiotropium 18 MICROgram(s) Capsule 1Capsule(s) Inhalation daily  methylPREDNISolone sodium succinate Injectable 40milliGRAM(s) IV Push every 8 hours  heparin  Injectable 5000Unit(s) SubCutaneous every 12 hours  acetaminophen   Tablet. 650milliGRAM(s) Oral every 6 hours PRN  oxyCODONE IR 5milliGRAM(s) Oral every 6 hours PRN  gabapentin 300milliGRAM(s) Oral three times a day  ferrous    sulfate 325milliGRAM(s) Oral daily  polyethylene glycol 3350 17Gram(s) Oral daily  montelukast 10milliGRAM(s) Oral daily  fluticasone propionate 50 MICROgram(s)/spray Nasal Spray 1Spray(s) Both Nostrils daily  artificial tears (preservative free) Ophthalmic Solution 1Drop(s) Both EYES three times a day  calcium carbonate  625 mG + Vitamin D (OsCal 250 + D) 1Tablet(s) Oral two times a day  ALPRAZolam 0.25milliGRAM(s) Oral three times a day  famotidine    Tablet 20milliGRAM(s) Oral two times a day      REVIEW OF SYSTEMS:  CONSTITUTIONAL: No fever, weight loss, or fatigue  NECK: No pain or stiffness  RESPIRATORY: + wheezing, No chills or hemoptysis; + shortness of breath  CARDIOVASCULAR: No chest pain, palpitations, dizziness, or leg swelling  GASTROINTESTINAL: No abdominal or epigastric pain. No nausea, vomiting, or hematemesis; No diarrhea or constipation. No melena or hematochezia.  GENITOURINARY: No dysuria, frequency, hematuria, or incontinence  NEUROLOGICAL: No headaches, loss of strength, numbness, or tremors  SKIN: No itching, burning  MUSCULOSKELETAL: No joint pain or swelling; No muscle, back, or extremity pain  PSYCHIATRIC: No depression, mood swings, HEME/LYMPH: No easy bruising, or bleeding gums  ALLERY AND IMMUNOLOGIC: No hives     RADIOLOGY & ADDITIONAL TESTS:    Imaging Personally Reviewed:  [ ] YES  [ ] NO    Consultant(s) Notes Reviewed:  [ x] YES  [ ] NO    PHYSICAL EXAM:  GENERAL: NAD, well-groomed, well-developed, on BIPAP  HEAD:  Atraumatic, Normocephalic  EYES: EOMI, PERRLA, conjunctiva and sclera clear  ENMT: No tonsillar erythema, exudates, or enlargement; Moist mucous membranes  NECK: Supple, No JVD  NERVOUS SYSTEM:  Awake & alert, Good concentration;   CHEST/LUNG: Clear to auscultation bilaterally; No rales, rhonchi, wheezing,  HEART: Regular rate and rhythm  ABDOMEN: Soft, Nontender, Nondistended; Bowel sounds present  EXTREMITIES:  No clubbing, cyanosis, or edema  LYMPH: No lymphadenopathy noted  SKIN: No rashes    Care Discussed with Consultants/Other Providers [ ] YES  [ ] NO    Advanced care planning discussed with patient/family [ x] YES   [ ] NO

## 2017-06-14 NOTE — PROGRESS NOTE ADULT - SUBJECTIVE AND OBJECTIVE BOX
Patient is a 71y old  Female who presents with a chief complaint of Shortness of breath (10 Rashawn 2017 14:32)      BRIEF HOSPITAL COURSE:   71F, COPD admitted with hypercarbic resp. failure requiring BiPAP    Events last 24 hours:   -patient currently on BiPAP    PAST MEDICAL & SURGICAL HISTORY:  Osteoporosis  Insomnia, unspecified type  Anxiety  Herpes zoster with other complication: Left arm.  Depression  Neuropathy  GERD (gastroesophageal reflux disease)  Chronic obstructive pulmonary disease, unspecified COPD type  Chronic obstructive pulmonary disease with acute lower respiratory infection  No significant past surgical history      Review of Systems:  CONSTITUTIONAL: No fever, chills, or fatigue  EYES: No eye pain, visual disturbances, or discharge  ENMT:  No difficulty hearing, tinnitus, vertigo; No sinus or throat pain  NECK: No pain or stiffness  RESPIRATORY: No cough, wheezing, chills or hemoptysis; No shortness of breath  CARDIOVASCULAR: No chest pain, palpitations, dizziness, or leg swelling  GASTROINTESTINAL: No abdominal or epigastric pain. No nausea, vomiting, or hematemesis; No diarrhea or constipation. No melena or hematochezia.  GENITOURINARY: No dysuria, frequency, hematuria, or incontinence  NEUROLOGICAL: No headaches, memory loss, loss of strength, numbness, or tremors  SKIN: No itching, burning, rashes, or lesions   MUSCULOSKELETAL: No joint pain or swelling; No muscle, back, or extremity pain  PSYCHIATRIC: No depression, anxiety, mood swings, or difficulty sleeping      Medications:      ALBUTerol/ipratropium for Nebulization 3milliLiter(s) Nebulizer every 6 hours  buDESOnide 160 MICROgram(s)/formoterol 4.5 MICROgram(s) Inhaler 1Puff(s) Inhalation two times a day  tiotropium 18 MICROgram(s) Capsule 1Capsule(s) Inhalation daily  montelukast 10milliGRAM(s) Oral daily    acetaminophen   Tablet. 650milliGRAM(s) Oral every 6 hours PRN  oxyCODONE IR 5milliGRAM(s) Oral every 6 hours PRN  gabapentin 300milliGRAM(s) Oral three times a day  ALPRAZolam 0.25milliGRAM(s) Oral three times a day      heparin  Injectable 5000Unit(s) SubCutaneous every 12 hours    polyethylene glycol 3350 17Gram(s) Oral daily  famotidine    Tablet 20milliGRAM(s) Oral two times a day      methylPREDNISolone sodium succinate Injectable 40milliGRAM(s) IV Push every 8 hours    ferrous    sulfate 325milliGRAM(s) Oral daily  calcium carbonate  625 mG + Vitamin D (OsCal 250 + D) 1Tablet(s) Oral two times a day      fluticasone propionate 50 MICROgram(s)/spray Nasal Spray 1Spray(s) Both Nostrils daily  artificial tears (preservative free) Ophthalmic Solution 1Drop(s) Both EYES three times a day            ICU Vital Signs Last 24 Hrs  T(C): 36.8, Max: 36.9 (06-14 @ 16:04)  T(F): 98.2, Max: 98.5 (06-14 @ 16:04)  HR: 87 (59 - 102)  BP: 172/80 (130/70 - 172/80)  BP(mean): 104 (90 - 110)  ABP: --  ABP(mean): --  RR: 19 (10 - 25)  SpO2: 97% (92% - 99%)          I&O's Detail  I & Os for 24h ending 14 Jun 2017 07:00  =============================================  IN:    Oral Fluid: 700 ml    sodium chloride 0.9%: 675 ml    IV PiggyBack: 100 ml    Total IN: 1475 ml  ---------------------------------------------  OUT:    Voided: 1250 ml    Total OUT: 1250 ml  ---------------------------------------------  Total NET: 225 ml    I & Os for current day (as of 14 Jun 2017 20:39)  =============================================  IN:    Total IN: 0 ml  ---------------------------------------------  OUT:    Voided: 200 ml    Total OUT: 200 ml  ---------------------------------------------  Total NET: -200 ml        LABS:                        11.4   4.6   )-----------( 142      ( 14 Jun 2017 06:45 )             32.4     06-14    146<H>  |  107  |  20  ----------------------------<  146<H>  4.0   |  37<H>  |  0.54    Ca    7.6<L>      14 Jun 2017 06:45            CAPILLARY BLOOD GLUCOSE        CULTURES:  Culture Results:   Normal Urogenital fanny present (06-10 @ 12:28)  Culture Results:   No growth to date. (06-10 @ 11:40)  Culture Results:   No growth to date. (06-10 @ 11:40)      Physical Examination:    General: No acute distress.  Alert, oriented, interactive, nonfocal    HEENT: Pupils equal, reactive to light.  Symmetric.    PULM: Clear to auscultation bilaterally, no significant sputum production    CVS: Regular rate and rhythm, no murmurs, rubs, or gallops    ABD: Soft, nondistended, nontender, normoactive bowel sounds, no masses    EXT: No edema, nontender    SKIN: Warm and well perfused, no rashes noted.        CRITICAL CARE TIME SPENT: 35 minutes

## 2017-06-14 NOTE — PROGRESS NOTE ADULT - ASSESSMENT
71 years old female with history of COPD, Anemia, history of respiratory failure, anxiety, who was at [facility]. Patient was having increasing shortness of breath. She was sent in to ER for same. Patient was in respiratory distress and was placed  on BiPAP. She is found to have respiratory failure. She is being admitted for further care.

## 2017-06-14 NOTE — PROGRESS NOTE ADULT - SUBJECTIVE AND OBJECTIVE BOX
PULMONARY/CRITICAL CARE      INTERVAL HPI/OVERNIGHT EVENTS: Pt improved slightly.  Alert. No fevers . Mild sob when off bipap.    71y FemaleHPI:  71 years old female with history of COPD, Anemia, history of respiratory failure, anxiety, who was at [facility]. Patient was having increasing shortness of breath. She was sent in to ER for same. Patient was in respiratory distress and was placed  on BiPAP. She is admitted for further management.   Patient is currently on BiPAP and continues to c/o shortness of breath.   She denies any chest pain or pressure.  No nausea or vomiting.   No fever or chills.   No dizziness or syncope. (10 Rashawn 2017 14:32)        PAST MEDICAL & SURGICAL HISTORY:  Osteoporosis  Insomnia, unspecified type  Anxiety  Herpes zoster with other complication: Left arm.  Depression  Neuropathy  GERD (gastroesophageal reflux disease)  Chronic obstructive pulmonary disease, unspecified COPD type  Chronic obstructive pulmonary disease with acute lower respiratory infection  No significant past surgical history        ICU Vital Signs Last 24 Hrs  T(C): 36.8, Max: 37.2 (06-13 @ 07:59)  T(F): 98.3, Max: 98.9 (06-13 @ 07:59)  HR: 69 (62 - 95)  BP: 154/74 (130/70 - 182/83)  BP(mean): 97 (88 - 116)  ABP: --  ABP(mean): --  RR: 13 (12 - 22)  SpO2: 98% (95% - 100%)    Qtts:     I&O's Summary  I & Os for 24h ending 13 Jun 2017 07:00  =============================================  IN: 1595 ml / OUT: 550 ml / NET: 1045 ml    I & Os for current day (as of 14 Jun 2017 06:27)  =============================================  IN: 1475 ml / OUT: 1250 ml / NET: 225 ml          REVIEW OF SYSTEMS:    CONSTITUTIONAL: No fever, weight loss, or fatigue  EYES: No eye pain, visual disturbances, or discharge  ENMT:  No difficulty hearing, tinnitus, vertigo; No sinus or throat pain  NECK: No pain or stiffness  BREASTS: No pain, masses, or nipple discharge  RESPIRATORY: No cough, wheezing, chills or hemoptysis; mild shortness of breath  CARDIOVASCULAR: No chest pain, palpitations, dizziness, or leg swelling  GASTROINTESTINAL: No abdominal or epigastric pain. No nausea, vomiting, or hematemesis; No diarrhea or constipation. No melena or hematochezia.  GENITOURINARY: No dysuria, frequency, hematuria, or incontinence  NEUROLOGICAL: No headaches, memory loss, loss of strength, numbness, or tremors  SKIN: No itching, burning, rashes, or lesions   LYMPH NODES: No enlarged glands  ENDOCRINE: No heat or cold intolerance; No hair loss  MUSCULOSKELETAL: No joint pain or swelling; No muscle, back, or extremity pain, no calf tenderness  PSYCHIATRIC: No depression, anxiety, mood swings, or difficulty sleeping  HEME/LYMPH: No easy bruising, or bleeding gums  ALLERGY AND IMMUNOLOGIC: No hives or eczema      PHYSICAL EXAM:    GENERAL: NAD, well-groomed, well-developed, NAD on bipap  HEAD:  Atraumatic, Normocephalic  EYES: EOMI, PERRLA, conjunctiva and sclera clear  ENMT: No tonsillar erythema, exudates, or enlargement; Moist mucous membranes, Good dentition, No lesions  NECK: Supple, No JVD, Normal thyroid  NERVOUS SYSTEM:  Alert & Oriented X3, Good concentration; Motor Strength 5/5 B/L upper and lower extremities  CHEST/LUNG: Few bilat wheeze, rhonchi. Good excursion. No change percussion, fremitus  HEART: Regular rate and rhythm; No murmurs, rubs, or gallops  ABDOMEN: Soft, Nontender, Nondistended; Bowel sounds present  EXTREMITIES:  2+ Peripheral Pulses, No clubbing, cyanosis, or edema  LYMPH: No lymphadenopathy noted  SKIN: No rashes or lesions          LABS:                        10.9   5.2   )-----------( 151      ( 13 Jun 2017 06:32 )             32.6     06-13    146<H>  |  108  |  17  ----------------------------<  138<H>  3.8   |  35<H>  |  0.56    Ca    7.7<L>      13 Jun 2017 06:32  Mg     2.4     06-12    TPro  5.8<L>  /  Alb  2.9<L>  /  TBili  0.3  /  DBili  x   /  AST  30  /  ALT  23  /  AlkPhos  46  06-12        ABG - ( 12 Jun 2017 08:01 )  pH: x     /  pCO2: 45    /  pO2: 126   / HCO3: 27    / Base Excess: 2.6   /  SaO2: 99                vanco through     RADIOLOGY & ADDITIONAL STUDIES:      CRITICAL CARE TIME SPENT:

## 2017-06-15 LAB
ANION GAP SERPL CALC-SCNC: 2 MMOL/L — LOW (ref 5–17)
BUN SERPL-MCNC: 22 MG/DL — SIGNIFICANT CHANGE UP (ref 7–23)
CALCIUM SERPL-MCNC: 8.6 MG/DL — SIGNIFICANT CHANGE UP (ref 8.4–10.5)
CHLORIDE SERPL-SCNC: 107 MMOL/L — SIGNIFICANT CHANGE UP (ref 96–108)
CO2 SERPL-SCNC: 38 MMOL/L — HIGH (ref 22–31)
CREAT SERPL-MCNC: 0.73 MG/DL — SIGNIFICANT CHANGE UP (ref 0.5–1.3)
CULTURE RESULTS: SIGNIFICANT CHANGE UP
CULTURE RESULTS: SIGNIFICANT CHANGE UP
GLUCOSE SERPL-MCNC: 163 MG/DL — HIGH (ref 70–99)
HCT VFR BLD CALC: 36 % — SIGNIFICANT CHANGE UP (ref 34.5–45)
HGB BLD-MCNC: 11.8 G/DL — SIGNIFICANT CHANGE UP (ref 11.5–15.5)
MAGNESIUM SERPL-MCNC: 2.8 MG/DL — HIGH (ref 1.6–2.6)
MCHC RBC-ENTMCNC: 31.4 PG — SIGNIFICANT CHANGE UP (ref 27–34)
MCHC RBC-ENTMCNC: 32.8 GM/DL — SIGNIFICANT CHANGE UP (ref 32–36)
MCV RBC AUTO: 95.8 FL — SIGNIFICANT CHANGE UP (ref 80–100)
PLATELET # BLD AUTO: 161 K/UL — SIGNIFICANT CHANGE UP (ref 150–400)
POTASSIUM SERPL-MCNC: 4.5 MMOL/L — SIGNIFICANT CHANGE UP (ref 3.5–5.3)
POTASSIUM SERPL-SCNC: 4.5 MMOL/L — SIGNIFICANT CHANGE UP (ref 3.5–5.3)
RBC # BLD: 3.76 M/UL — LOW (ref 3.8–5.2)
RBC # FLD: 12.8 % — SIGNIFICANT CHANGE UP (ref 10.3–14.5)
SODIUM SERPL-SCNC: 147 MMOL/L — HIGH (ref 135–145)
SPECIMEN SOURCE: SIGNIFICANT CHANGE UP
SPECIMEN SOURCE: SIGNIFICANT CHANGE UP
WBC # BLD: 7.4 K/UL — SIGNIFICANT CHANGE UP (ref 3.8–10.5)
WBC # FLD AUTO: 7.4 K/UL — SIGNIFICANT CHANGE UP (ref 3.8–10.5)

## 2017-06-15 RX ADMIN — Medication 3 MILLILITER(S): at 07:16

## 2017-06-15 RX ADMIN — Medication 3 MILLILITER(S): at 00:45

## 2017-06-15 RX ADMIN — Medication 0.25 MILLIGRAM(S): at 21:13

## 2017-06-15 RX ADMIN — Medication 1 TABLET(S): at 17:15

## 2017-06-15 RX ADMIN — BUDESONIDE AND FORMOTEROL FUMARATE DIHYDRATE 1 PUFF(S): 160; 4.5 AEROSOL RESPIRATORY (INHALATION) at 18:42

## 2017-06-15 RX ADMIN — Medication 325 MILLIGRAM(S): at 11:49

## 2017-06-15 RX ADMIN — Medication 1 DROP(S): at 14:47

## 2017-06-15 RX ADMIN — Medication 1 DROP(S): at 06:20

## 2017-06-15 RX ADMIN — TIOTROPIUM BROMIDE 1 CAPSULE(S): 18 CAPSULE ORAL; RESPIRATORY (INHALATION) at 06:29

## 2017-06-15 RX ADMIN — HEPARIN SODIUM 5000 UNIT(S): 5000 INJECTION INTRAVENOUS; SUBCUTANEOUS at 17:15

## 2017-06-15 RX ADMIN — OXYCODONE HYDROCHLORIDE 5 MILLIGRAM(S): 5 TABLET ORAL at 21:38

## 2017-06-15 RX ADMIN — HEPARIN SODIUM 5000 UNIT(S): 5000 INJECTION INTRAVENOUS; SUBCUTANEOUS at 06:21

## 2017-06-15 RX ADMIN — OXYCODONE HYDROCHLORIDE 5 MILLIGRAM(S): 5 TABLET ORAL at 22:30

## 2017-06-15 RX ADMIN — Medication 1 TABLET(S): at 06:21

## 2017-06-15 RX ADMIN — Medication 0.25 MILLIGRAM(S): at 14:47

## 2017-06-15 RX ADMIN — Medication 3 MILLILITER(S): at 12:53

## 2017-06-15 RX ADMIN — Medication 40 MILLIGRAM(S): at 06:21

## 2017-06-15 RX ADMIN — POLYETHYLENE GLYCOL 3350 17 GRAM(S): 17 POWDER, FOR SOLUTION ORAL at 11:49

## 2017-06-15 RX ADMIN — Medication 3 MILLILITER(S): at 19:39

## 2017-06-15 RX ADMIN — FAMOTIDINE 20 MILLIGRAM(S): 10 INJECTION INTRAVENOUS at 17:15

## 2017-06-15 RX ADMIN — Medication 1 DROP(S): at 21:13

## 2017-06-15 RX ADMIN — BUDESONIDE AND FORMOTEROL FUMARATE DIHYDRATE 1 PUFF(S): 160; 4.5 AEROSOL RESPIRATORY (INHALATION) at 06:29

## 2017-06-15 RX ADMIN — GABAPENTIN 300 MILLIGRAM(S): 400 CAPSULE ORAL at 14:47

## 2017-06-15 RX ADMIN — Medication 0.25 MILLIGRAM(S): at 06:21

## 2017-06-15 RX ADMIN — GABAPENTIN 300 MILLIGRAM(S): 400 CAPSULE ORAL at 06:21

## 2017-06-15 RX ADMIN — Medication 50 MILLIGRAM(S): at 17:15

## 2017-06-15 RX ADMIN — MONTELUKAST 10 MILLIGRAM(S): 4 TABLET, CHEWABLE ORAL at 11:49

## 2017-06-15 RX ADMIN — FAMOTIDINE 20 MILLIGRAM(S): 10 INJECTION INTRAVENOUS at 06:21

## 2017-06-15 RX ADMIN — GABAPENTIN 300 MILLIGRAM(S): 400 CAPSULE ORAL at 21:13

## 2017-06-15 RX ADMIN — Medication 1 SPRAY(S): at 11:50

## 2017-06-15 NOTE — PROGRESS NOTE ADULT - ASSESSMENT
Pt with O2 dependent COPD c/o increasing sob for few days .  Acute Hypercarbic respiratory failure , now on bipap. No evidence bacterial infection. Still wheezing, but significantly less hypercarbic.  Bullous emphysema.  Slow improvement

## 2017-06-15 NOTE — PROGRESS NOTE ADULT - SUBJECTIVE AND OBJECTIVE BOX
Patient is a 71y old  Female who presents with a chief complaint of Shortness of breath (10 Rashawn 2017 14:32)    HPI:  71 years old female with history of COPD, Anemia, history of respiratory failure, anxiety, who was at [facility]. Patient was having increasing shortness of breath. She was sent in to ER for same. Patient was in respiratory distress and was placed  on BiPAP. She is admitted for further management.   Patient is currently on BiPAP and continues to c/o shortness of breath.   She denies any chest pain or pressure.  No nausea or vomiting.   No fever or chills.   No dizziness or syncope. (10 Rashawn 2017 14:32)    INTERVAL HPI/OVERNIGHT EVENTS:  Chart reviewed, notes reviewed.   Patient seen and examined.  Being followed by following specialists: Pulmonary    Consultant(s) Notes Reviewed:  [X] Yes    Care Discussed with Consultants/Other Providers: [X] Yes  Still on BiPAP most of the time.   Doing better.  Anxiety is better on increased dose of Xanax. .   No chest pain or pressure.   No fever or chills.     REVIEW OF SYSTEMS:  CONSTITUTIONAL: No fever, weight loss, or fatigue EYES: No eye pain, visual disturbances, or discharge ENMT:  No difficulty hearing, tinnitus, vertigo; No sinus or throat pain NECK: No pain or stiffness BREASTS: No pain, masses, or nipple discharge RESPIRATORY: + shortness of breath CARDIOVASCULAR: No chest pain, palpitations, dizziness, or leg swelling GASTROINTESTINAL: No abdominal or epigastric pain. No nausea, vomiting, or hematemesis; No diarrhea or constipation. No melena or hematochezia. GENITOURINARY: No dysuria, frequency, hematuria, or incontinence NEUROLOGICAL: No headaches, memory loss, loss of strength, numbness, or tremors SKIN: No itching, burning, rashes, or lesions  LYMPH NODES: No enlarged glands ENDOCRINE: No heat or cold intolerance; No hair loss; No polydipsia or polyuria MUSCULOSKELETAL: No joint pain or swelling; No muscle, back, or extremity pain PSYCHIATRIC: No depression, anxiety, mood swings, or difficulty sleeping HEME/LYMPH: No easy bruising, or bleeding gums ALLERGY AND IMMUNOLOGIC: No hives or eczema	    Allergies : No Known Allergies    Intolerances    MEDICATIONS  (STANDING):  ALBUTerol/ipratropium for Nebulization 3milliLiter(s) Nebulizer every 6 hours  buDESOnide 160 MICROgram(s)/formoterol 4.5 MICROgram(s) Inhaler 1Puff(s) Inhalation two times a day  tiotropium 18 MICROgram(s) Capsule 1Capsule(s) Inhalation daily  heparin  Injectable 5000Unit(s) SubCutaneous every 12 hours  gabapentin 300milliGRAM(s) Oral three times a day  ferrous    sulfate 325milliGRAM(s) Oral daily  polyethylene glycol 3350 17Gram(s) Oral daily  montelukast 10milliGRAM(s) Oral daily  fluticasone propionate 50 MICROgram(s)/spray Nasal Spray 1Spray(s) Both Nostrils daily  artificial tears (preservative free) Ophthalmic Solution 1Drop(s) Both EYES three times a day  calcium carbonate  625 mG + Vitamin D (OsCal 250 + D) 1Tablet(s) Oral two times a day  ALPRAZolam 0.25milliGRAM(s) Oral three times a day  famotidine    Tablet 20milliGRAM(s) Oral two times a day  predniSONE   Tablet 50milliGRAM(s) Oral daily    MEDICATIONS  (PRN):  acetaminophen   Tablet. 650milliGRAM(s) Oral every 6 hours PRN Mild Pain (1 - 3)  oxyCODONE IR 5milliGRAM(s) Oral every 6 hours PRN Moderate Pain (4 - 6)    ICU Vital Signs Last 24 Hrs  T(C): 36.6, Max: 37 (06-15 @ 08:05)  T(F): 97.8, Max: 98.6 (06-15 @ 08:05)  HR: 71 (68 - 97)  BP: 156/83 (139/66 - 192/92)  BP(mean): 103 (89 - 121)  ABP: --  ABP(mean): --  RR: 17 (9 - 19)  SpO2: 100% (97% - 100%)    PHYSICAL EXAM:  GENERAL: On BiPAP, well-groomed, well-developed HEAD:  Atraumatic, Normocephalic EYES: EOMI, PERRLA, conjunctiva and sclera clear ENMT: No tonsillar erythema, exudates, or enlargement; Moist mucous membranes, Good dentition, No lesions NECK: Supple, No JVD, Normal thyroid CHEST/LUNG: B/L decreased breath sounds noted.  HEART: Regular rate and rhythm; No murmurs, rubs, or gallops ABDOMEN: Soft, Nontender, Nondistended; Bowel sounds present EXTREMITIES:  2+ Peripheral Pulses, No clubbing, cyanosis, or edema MS: No joint swelling or deformity.  LYMPH: No lymphadenopathy noted SKIN: No rashes or lesions NERVOUS SYSTEM:  Motor Strength 3/5 B/L upper and lower extremities; No gross focal deficit noted.  PSYCH:  sleepy but wakes up on calling her name. Oriented to place and person. .	         LABS:                                              11.8   7.4   )-----------( 161      ( 15 Rashawn 2017 06:35 )             36.0     15 Rashawn 2017 06:35    147    |  107    |  22     ----------------------------<  163    4.5     |  38     |  0.73     Ca    8.6        15 Rashawn 2017 06:35  Mg     2.8       15 Rashawn 2017 06:35      CAPILLARY BLOOD GLUCOSE      06-11 [id number] 5.4    06-11 Chol 229<H> <H> HDL 58 Trig 127  Iron Total, Serum: 96 ug/dL (06-13 @ 12:06)             RADIOLOGY TEST: (IMAGES REVIEWED BY ME)  EXAM:  CT CHEST                          PROCEDURE DATE:  06/12/2017      INTERPRETATION:  Clinical information: COPD, difficulty breathing    No prior studies present for comparison    Axial images obtained, coronal and sagittal images computer reformatted.   Intravenous contrast material not administered limiting evaluation of   hilar and mediastinal regions.    Evidence of central lobular emphysema. Parenchymal scarring right   posterior apex. Bilateral pleural thickening posterioraspect both upper   lobes. Bullous disease present most prominent at the right lung base.   Calcified granulomas present multiple locations. No vascular congestion.   No pneumothorax. No pleural effusion.    Minimal atelectasis /scarring, left lung base. Central airway intact.   Thyroid gland not enlarged. Minimal anterior pericardial thickening or   small localized pericardial effusion. Coronary artery calcifications   present. Small anterior mediastinal soft tissue density measures in the   cystic range. No hilar lesions identified, evaluation limited due to lack   of IV contrast. No thoracic aortic aneurysm.    No adrenal lesions. The spleen is not enlarged. Hypodensities in the   right and left hepatic lobes have the appearance of cysts.   Postcholecystectomy clips present. No acute osseous abnormalities.   Bilateral rib fractures with callus formation.    IMPRESSION: Centrilobular emphysema. See additional findings as described   above.  Imaging Personally Reviewed:  [X] YES        HEALTH ISSUES - PROBLEM Dx:  Acute respiratory failure with hypercapnia: Acute respiratory failure with hypercapnia  Need for prophylactic measure: Need for prophylactic measure  Depression, unspecified depression type: Depression, unspecified depression type  Osteoporosis, unspecified osteoporosis type, unspecified pathological fracture presence: Osteoporosis, unspecified osteoporosis type, unspecified pathological fracture presence  Anxiety: Anxiety  Gastroesophageal reflux disease, esophagitis presence not specified: Gastroesophageal reflux disease, esophagitis presence not specified  Acute on chronic respiratory failure with hypercapnia: Acute on chronic respiratory failure with hypercapnia  GERD (gastroesophageal reflux disease): GERD (gastroesophageal reflux disease)  Depression: Depression  COPD exacerbation: COPD exacerbation  Acute respiratory failure: Acute respiratory failure

## 2017-06-15 NOTE — PROGRESS NOTE ADULT - SUBJECTIVE AND OBJECTIVE BOX
PULMONARY/CRITICAL CARE      INTERVAL HPI/OVERNIGHT EVENTS: Pt improved. Used nasal O2 during day. Less sob, wheeze. Alert and conversant.    71y FemaleHPI:  71 years old female with history of COPD, Anemia, history of respiratory failure, anxiety, who was at [facility]. Patient was having increasing shortness of breath. She was sent in to ER for same. Patient was in respiratory distress and was placed  on BiPAP. She is admitted for further management.   Patient is currently on BiPAP and continues to c/o shortness of breath.   She denies any chest pain or pressure.  No nausea or vomiting.   No fever or chills.   No dizziness or syncope. (10 Rashawn 2017 14:32)        PAST MEDICAL & SURGICAL HISTORY:  Osteoporosis  Insomnia, unspecified type  Anxiety  Herpes zoster with other complication: Left arm.  Depression  Neuropathy  GERD (gastroesophageal reflux disease)  Chronic obstructive pulmonary disease, unspecified COPD type  Chronic obstructive pulmonary disease with acute lower respiratory infection  No significant past surgical history        ICU Vital Signs Last 24 Hrs  T(C): 36.4, Max: 36.9 (06-14 @ 16:04)  T(F): 97.6, Max: 98.5 (06-14 @ 16:04)  HR: 70 (68 - 102)  BP: 139/66 (139/66 - 174/76)  BP(mean): 89 (89 - 110)  ABP: --  ABP(mean): --  RR: 9 (9 - 25)  SpO2: 99% (92% - 99%)    Qtts:     I&O's Summary    I & Os for current day (as of 15 Rashawn 2017 07:30)  =============================================  IN: 120 ml / OUT: 500 ml / NET: -380 ml        REVIEW OF SYSTEMS:    CONSTITUTIONAL: No fever, weight loss, or fatigue  EYES: No eye pain, visual disturbances, or discharge  ENMT:  No difficulty hearing, tinnitus, vertigo; No sinus or throat pain  NECK: No pain or stiffness  BREASTS: No pain, masses, or nipple discharge  RESPIRATORY: No cough, wheezing, chills or hemoptysis; mild shortness of breath  CARDIOVASCULAR: No chest pain, palpitations, dizziness, or leg swelling  GASTROINTESTINAL: No abdominal or epigastric pain. No nausea, vomiting, or hematemesis; No diarrhea or constipation. No melena or hematochezia.  GENITOURINARY: No dysuria, frequency, hematuria, or incontinence  NEUROLOGICAL: No headaches, memory loss, loss of strength, numbness, or tremors  SKIN: No itching, burning, rashes, or lesions   LYMPH NODES: No enlarged glands  ENDOCRINE: No heat or cold intolerance; No hair loss  MUSCULOSKELETAL: No joint pain or swelling; No muscle, back, or extremity pain, no calf tenderness  PSYCHIATRIC: No depression, anxiety, mood swings, or difficulty sleeping  HEME/LYMPH: No easy bruising, or bleeding gums  ALLERGY AND IMMUNOLOGIC: No hives or eczema      PHYSICAL EXAM:    GENERAL: NAD, well-groomed, well-developed, NAD on bipap  HEAD:  Atraumatic, Normocephalic  EYES: EOMI, PERRLA, conjunctiva and sclera clear  ENMT: No tonsillar erythema, exudates, or enlargement; Moist mucous membranes, Good dentition, No lesions  NECK: Supple, No JVD, Normal thyroid  NERVOUS SYSTEM:  Alert & Oriented X3, Good concentration; Motor Strength 5/5 B/L upper and lower extremities  CHEST/LUNG: Few bilat wheeze, rhonchi. Good excursion. No change percussion, fremitus  HEART: Regular rate and rhythm; No murmurs, rubs, or gallops  ABDOMEN: Soft, Nontender, Nondistended; Bowel sounds present  EXTREMITIES:  2+ Peripheral Pulses, No clubbing, cyanosis, or edema  LYMPH: No lymphadenopathy noted  SKIN: No rashes or lesions          LABS:                        11.8   7.4   )-----------( 161      ( 15 Rashawn 2017 06:35 )             36.0     06-15    147<H>  |  107  |  22  ----------------------------<  163<H>  4.5   |  38<H>  |  0.73    Ca    8.6      15 Rashawn 2017 06:35  Mg     2.8     06-15            vanco through     RADIOLOGY & ADDITIONAL STUDIES:      CRITICAL CARE TIME SPENT:

## 2017-06-15 NOTE — PROGRESS NOTE ADULT - SUBJECTIVE AND OBJECTIVE BOX
Patient is a 71y old  Female who presents with a chief complaint of Shortness of breath (10 Rashawn 2017 14:32)      BRIEF HOSPITAL COURSE:   71F, COPD admitted with hypercarbic resp. failure requiring BiPAP    Events last 24 hours:   -was able to stay off BiPAP during day, remained on nasal cannula    PAST MEDICAL & SURGICAL HISTORY:  Osteoporosis  Insomnia, unspecified type  Anxiety  Herpes zoster with other complication: Left arm.  Depression  Neuropathy  GERD (gastroesophageal reflux disease)  Chronic obstructive pulmonary disease, unspecified COPD type  Chronic obstructive pulmonary disease with acute lower respiratory infection  No significant past surgical history      Review of Systems:  CONSTITUTIONAL: No fever, chills, or fatigue  EYES: No eye pain, visual disturbances, or discharge  ENMT:  No difficulty hearing, tinnitus, vertigo; No sinus or throat pain  NECK: No pain or stiffness  RESPIRATORY: No cough, wheezing, chills or hemoptysis; No shortness of breath  CARDIOVASCULAR: No chest pain, palpitations, dizziness, or leg swelling  GASTROINTESTINAL: No abdominal or epigastric pain. No nausea, vomiting, or hematemesis; No diarrhea or constipation. No melena or hematochezia.  GENITOURINARY: No dysuria, frequency, hematuria, or incontinence  NEUROLOGICAL: No headaches, memory loss, loss of strength, numbness, or tremors  SKIN: No itching, burning, rashes, or lesions   MUSCULOSKELETAL: No joint pain or swelling; No muscle, back, or extremity pain  PSYCHIATRIC: No depression, anxiety, mood swings, or difficulty sleeping      Medications:      ALBUTerol/ipratropium for Nebulization 3milliLiter(s) Nebulizer every 6 hours  buDESOnide 160 MICROgram(s)/formoterol 4.5 MICROgram(s) Inhaler 1Puff(s) Inhalation two times a day  tiotropium 18 MICROgram(s) Capsule 1Capsule(s) Inhalation daily  montelukast 10milliGRAM(s) Oral daily    acetaminophen   Tablet. 650milliGRAM(s) Oral every 6 hours PRN  oxyCODONE IR 5milliGRAM(s) Oral every 6 hours PRN  gabapentin 300milliGRAM(s) Oral three times a day  ALPRAZolam 0.25milliGRAM(s) Oral three times a day      heparin  Injectable 5000Unit(s) SubCutaneous every 12 hours    polyethylene glycol 3350 17Gram(s) Oral daily  famotidine    Tablet 20milliGRAM(s) Oral two times a day      predniSONE   Tablet 50milliGRAM(s) Oral daily    ferrous    sulfate 325milliGRAM(s) Oral daily  calcium carbonate  625 mG + Vitamin D (OsCal 250 + D) 1Tablet(s) Oral two times a day      fluticasone propionate 50 MICROgram(s)/spray Nasal Spray 1Spray(s) Both Nostrils daily  artificial tears (preservative free) Ophthalmic Solution 1Drop(s) Both EYES three times a day            ICU Vital Signs Last 24 Hrs  T(C): 36.8, Max: 37 (06-15 @ 08:05)  T(F): 98.3, Max: 98.6 (06-15 @ 08:05)  HR: 84 (68 - 98)  BP: 157/70 (139/66 - 192/92)  BP(mean): 95 (89 - 121)  ABP: --  ABP(mean): --  RR: 13 (9 - 17)  SpO2: 99% (97% - 100%)          I&O's Detail  I & Os for 24h ending 15 Rashawn 2017 07:00  =============================================  IN:    Oral Fluid: 240 ml    Total IN: 240 ml  ---------------------------------------------  OUT:    Voided: 750 ml    Total OUT: 750 ml  ---------------------------------------------  Total NET: -510 ml    I & Os for current day (as of 15 Rashawn 2017 20:32)  =============================================  IN:    Oral Fluid: 300 ml    Total IN: 300 ml  ---------------------------------------------  OUT:    Total OUT: 0 ml  ---------------------------------------------  Total NET: 300 ml        LABS:                        11.8   7.4   )-----------( 161      ( 15 Rashawn 2017 06:35 )             36.0     06-15    147<H>  |  107  |  22  ----------------------------<  163<H>  4.5   |  38<H>  |  0.73    Ca    8.6      15 Rashawn 2017 06:35  Mg     2.8     06-15            CAPILLARY BLOOD GLUCOSE        CULTURES:  Culture Results:   Normal Urogenital fanny present (06-10 @ 12:28)  Culture Results:   No growth at 5 days. (06-10 @ 11:40)  Culture Results:   No growth at 5 days. (06-10 @ 11:40)      Physical Examination:    General: No acute distress.  Alert, oriented, interactive, nonfocal    HEENT: Pupils equal, reactive to light.  Symmetric.    PULM: Clear to auscultation bilaterally, no significant sputum production    CVS: Regular rate and rhythm, no murmurs, rubs, or gallops    ABD: Soft, nondistended, nontender, normoactive bowel sounds, no masses    EXT: No edema, nontender    SKIN: Warm and well perfused, no rashes noted.    RADIOLOGY: ***    CRITICAL CARE TIME SPENT: ***

## 2017-06-16 RX ADMIN — Medication 1 SPRAY(S): at 11:54

## 2017-06-16 RX ADMIN — Medication 0.25 MILLIGRAM(S): at 14:07

## 2017-06-16 RX ADMIN — OXYCODONE HYDROCHLORIDE 5 MILLIGRAM(S): 5 TABLET ORAL at 21:01

## 2017-06-16 RX ADMIN — Medication 3 MILLILITER(S): at 14:05

## 2017-06-16 RX ADMIN — Medication 0.25 MILLIGRAM(S): at 21:01

## 2017-06-16 RX ADMIN — GABAPENTIN 300 MILLIGRAM(S): 400 CAPSULE ORAL at 14:07

## 2017-06-16 RX ADMIN — OXYCODONE HYDROCHLORIDE 5 MILLIGRAM(S): 5 TABLET ORAL at 22:02

## 2017-06-16 RX ADMIN — Medication 3 MILLILITER(S): at 10:08

## 2017-06-16 RX ADMIN — TIOTROPIUM BROMIDE 1 CAPSULE(S): 18 CAPSULE ORAL; RESPIRATORY (INHALATION) at 06:32

## 2017-06-16 RX ADMIN — Medication 3 MILLILITER(S): at 19:26

## 2017-06-16 RX ADMIN — Medication 1 TABLET(S): at 05:46

## 2017-06-16 RX ADMIN — HEPARIN SODIUM 5000 UNIT(S): 5000 INJECTION INTRAVENOUS; SUBCUTANEOUS at 17:53

## 2017-06-16 RX ADMIN — FAMOTIDINE 20 MILLIGRAM(S): 10 INJECTION INTRAVENOUS at 05:46

## 2017-06-16 RX ADMIN — BUDESONIDE AND FORMOTEROL FUMARATE DIHYDRATE 1 PUFF(S): 160; 4.5 AEROSOL RESPIRATORY (INHALATION) at 18:31

## 2017-06-16 RX ADMIN — HEPARIN SODIUM 5000 UNIT(S): 5000 INJECTION INTRAVENOUS; SUBCUTANEOUS at 05:46

## 2017-06-16 RX ADMIN — GABAPENTIN 300 MILLIGRAM(S): 400 CAPSULE ORAL at 21:01

## 2017-06-16 RX ADMIN — Medication 1 DROP(S): at 21:01

## 2017-06-16 RX ADMIN — Medication 1 DROP(S): at 14:07

## 2017-06-16 RX ADMIN — GABAPENTIN 300 MILLIGRAM(S): 400 CAPSULE ORAL at 05:46

## 2017-06-16 RX ADMIN — MONTELUKAST 10 MILLIGRAM(S): 4 TABLET, CHEWABLE ORAL at 11:54

## 2017-06-16 RX ADMIN — FAMOTIDINE 20 MILLIGRAM(S): 10 INJECTION INTRAVENOUS at 17:53

## 2017-06-16 RX ADMIN — Medication 1 TABLET(S): at 17:53

## 2017-06-16 RX ADMIN — Medication 1 DROP(S): at 05:46

## 2017-06-16 RX ADMIN — BUDESONIDE AND FORMOTEROL FUMARATE DIHYDRATE 1 PUFF(S): 160; 4.5 AEROSOL RESPIRATORY (INHALATION) at 06:32

## 2017-06-16 RX ADMIN — Medication 0.25 MILLIGRAM(S): at 05:46

## 2017-06-16 RX ADMIN — Medication 325 MILLIGRAM(S): at 11:54

## 2017-06-16 RX ADMIN — Medication 50 MILLIGRAM(S): at 05:46

## 2017-06-16 NOTE — PROGRESS NOTE ADULT - SUBJECTIVE AND OBJECTIVE BOX
PULMONARY/CRITICAL CARE      INTERVAL HPI/OVERNIGHT EVENTS: Pt stable, less sob. Still wheezing. Using nasal O2 during day, cpap at nite. Ambulated.    71y FemaleHPI:  71 years old female with history of COPD, Anemia, history of respiratory failure, anxiety, who was at [facility]. Patient was having increasing shortness of breath. She was sent in to ER for same. Patient was in respiratory distress and was placed  on BiPAP. She is admitted for further management.   Patient is currently on BiPAP and continues to c/o shortness of breath.   She denies any chest pain or pressure.  No nausea or vomiting.   No fever or chills.   No dizziness or syncope. (10 Rashawn 2017 14:32)        PAST MEDICAL & SURGICAL HISTORY:  Osteoporosis  Insomnia, unspecified type  Anxiety  Herpes zoster with other complication: Left arm.  Depression  Neuropathy  GERD (gastroesophageal reflux disease)  Chronic obstructive pulmonary disease, unspecified COPD type  Chronic obstructive pulmonary disease with acute lower respiratory infection  No significant past surgical history        ICU Vital Signs Last 24 Hrs  T(C): 36.8, Max: 37 (06-15 @ 08:05)  T(F): 98.3, Max: 98.6 (06-15 @ 08:05)  HR: 81 (68 - 98)  BP: 165/109 (144/69 - 192/92)  BP(mean): 126 (90 - 126)  ABP: --  ABP(mean): --  RR: 13 (9 - 17)  SpO2: 98% (96% - 100%)    Qtts:     I&O's Summary  I & Os for 24h ending 15 Rashawn 2017 07:00  =============================================  IN: 240 ml / OUT: 750 ml / NET: -510 ml    I & Os for current day (as of 16 Jun 2017 06:41)  =============================================  IN: 300 ml / OUT: 0 ml / NET: 300 ml      REVIEW OF SYSTEMS:    CONSTITUTIONAL: No fever, weight loss, or fatigue  EYES: No eye pain, visual disturbances, or discharge  ENMT:  No difficulty hearing, tinnitus, vertigo; No sinus or throat pain  NECK: No pain or stiffness  BREASTS: No pain, masses, or nipple discharge  RESPIRATORY: No cough, wheezing, chills or hemoptysis; mild shortness of breath  CARDIOVASCULAR: No chest pain, palpitations, dizziness, or leg swelling  GASTROINTESTINAL: No abdominal or epigastric pain. No nausea, vomiting, or hematemesis; No diarrhea or constipation. No melena or hematochezia.  GENITOURINARY: No dysuria, frequency, hematuria, or incontinence  NEUROLOGICAL: No headaches, memory loss, loss of strength, numbness, or tremors  SKIN: No itching, burning, rashes, or lesions   LYMPH NODES: No enlarged glands  ENDOCRINE: No heat or cold intolerance; No hair loss  MUSCULOSKELETAL: No joint pain or swelling; No muscle, back, or extremity pain, no calf tenderness  PSYCHIATRIC: No depression, anxiety, mood swings, or difficulty sleeping  HEME/LYMPH: No easy bruising, or bleeding gums  ALLERGY AND IMMUNOLOGIC: No hives or eczema      PHYSICAL EXAM:    GENERAL: NAD, well-groomed, well-developed, NAD on bipap  HEAD:  Atraumatic, Normocephalic  EYES: EOMI, PERRLA, conjunctiva and sclera clear  ENMT: No tonsillar erythema, exudates, or enlargement; Moist mucous membranes, Good dentition, No lesions  NECK: Supple, No JVD, Normal thyroid  NERVOUS SYSTEM:  Alert & Oriented X3, Good concentration; Motor Strength 5/5 B/L upper and lower extremities  CHEST/LUNG: Few bilat wheeze, rhonchi. Good excursion. No change percussion, fremitus  HEART: Regular rate and rhythm; No murmurs, rubs, or gallops  ABDOMEN: Soft, Nontender, Nondistended; Bowel sounds present  EXTREMITIES:  2+ Peripheral Pulses, No clubbing, cyanosis, or edema  LYMPH: No lymphadenopathy noted  SKIN: No rashes or lesions      LABS:                        11.8   7.4   )-----------( 161      ( 15 Rashawn 2017 06:35 )             36.0     06-15    147<H>  |  107  |  22  ----------------------------<  163<H>  4.5   |  38<H>  |  0.73    Ca    8.6      15 Rashawn 2017 06:35  Mg     2.8     06-15                RADIOLOGY & ADDITIONAL STUDIES:      CRITICAL CARE TIME SPENT:

## 2017-06-16 NOTE — PROGRESS NOTE ADULT - ASSESSMENT
71 years old female with history of COPD, Anemia, history of respiratory failure, anxiety, who was at [facility]. Patient was having increasing shortness of breath. She was sent in to ER for same. Patient was in respiratory distress and was placed  on BiPAP. She is found to have respiratory failure. She is being admitted for further care.

## 2017-06-17 LAB
ANION GAP SERPL CALC-SCNC: 1 MMOL/L — LOW (ref 5–17)
BUN SERPL-MCNC: 24 MG/DL — HIGH (ref 7–23)
CALCIUM SERPL-MCNC: 8.5 MG/DL — SIGNIFICANT CHANGE UP (ref 8.4–10.5)
CHLORIDE SERPL-SCNC: 106 MMOL/L — SIGNIFICANT CHANGE UP (ref 96–108)
CO2 SERPL-SCNC: 40 MMOL/L — HIGH (ref 22–31)
CREAT SERPL-MCNC: 0.6 MG/DL — SIGNIFICANT CHANGE UP (ref 0.5–1.3)
GLUCOSE SERPL-MCNC: 87 MG/DL — SIGNIFICANT CHANGE UP (ref 70–99)
HCT VFR BLD CALC: 35.9 % — SIGNIFICANT CHANGE UP (ref 34.5–45)
HGB BLD-MCNC: 11.6 G/DL — SIGNIFICANT CHANGE UP (ref 11.5–15.5)
MCHC RBC-ENTMCNC: 31.2 PG — SIGNIFICANT CHANGE UP (ref 27–34)
MCHC RBC-ENTMCNC: 32.2 GM/DL — SIGNIFICANT CHANGE UP (ref 32–36)
MCV RBC AUTO: 97 FL — SIGNIFICANT CHANGE UP (ref 80–100)
PLATELET # BLD AUTO: 188 K/UL — SIGNIFICANT CHANGE UP (ref 150–400)
POTASSIUM SERPL-MCNC: 3.7 MMOL/L — SIGNIFICANT CHANGE UP (ref 3.5–5.3)
POTASSIUM SERPL-SCNC: 3.7 MMOL/L — SIGNIFICANT CHANGE UP (ref 3.5–5.3)
RBC # BLD: 3.7 M/UL — LOW (ref 3.8–5.2)
RBC # FLD: 13.4 % — SIGNIFICANT CHANGE UP (ref 10.3–14.5)
SODIUM SERPL-SCNC: 147 MMOL/L — HIGH (ref 135–145)
WBC # BLD: 12.1 K/UL — HIGH (ref 3.8–10.5)
WBC # FLD AUTO: 12.1 K/UL — HIGH (ref 3.8–10.5)

## 2017-06-17 RX ORDER — BUDESONIDE AND FORMOTEROL FUMARATE DIHYDRATE 160; 4.5 UG/1; UG/1
2 AEROSOL RESPIRATORY (INHALATION)
Refills: 0 | Status: DISCONTINUED | OUTPATIENT
Start: 2017-06-17 | End: 2017-06-19

## 2017-06-17 RX ADMIN — GABAPENTIN 300 MILLIGRAM(S): 400 CAPSULE ORAL at 05:42

## 2017-06-17 RX ADMIN — Medication 1 DROP(S): at 22:01

## 2017-06-17 RX ADMIN — Medication 0.25 MILLIGRAM(S): at 05:42

## 2017-06-17 RX ADMIN — BUDESONIDE AND FORMOTEROL FUMARATE DIHYDRATE 1 PUFF(S): 160; 4.5 AEROSOL RESPIRATORY (INHALATION) at 06:50

## 2017-06-17 RX ADMIN — Medication 1 TABLET(S): at 17:37

## 2017-06-17 RX ADMIN — Medication 1 DROP(S): at 05:41

## 2017-06-17 RX ADMIN — GABAPENTIN 300 MILLIGRAM(S): 400 CAPSULE ORAL at 22:01

## 2017-06-17 RX ADMIN — Medication 3 MILLILITER(S): at 12:34

## 2017-06-17 RX ADMIN — Medication 1 DROP(S): at 14:45

## 2017-06-17 RX ADMIN — TIOTROPIUM BROMIDE 1 CAPSULE(S): 18 CAPSULE ORAL; RESPIRATORY (INHALATION) at 06:50

## 2017-06-17 RX ADMIN — Medication 325 MILLIGRAM(S): at 12:29

## 2017-06-17 RX ADMIN — Medication 1 SPRAY(S): at 12:29

## 2017-06-17 RX ADMIN — Medication 3 MILLILITER(S): at 07:27

## 2017-06-17 RX ADMIN — Medication 0.25 MILLIGRAM(S): at 22:14

## 2017-06-17 RX ADMIN — HEPARIN SODIUM 5000 UNIT(S): 5000 INJECTION INTRAVENOUS; SUBCUTANEOUS at 05:42

## 2017-06-17 RX ADMIN — BUDESONIDE AND FORMOTEROL FUMARATE DIHYDRATE 2 PUFF(S): 160; 4.5 AEROSOL RESPIRATORY (INHALATION) at 17:36

## 2017-06-17 RX ADMIN — FAMOTIDINE 20 MILLIGRAM(S): 10 INJECTION INTRAVENOUS at 05:42

## 2017-06-17 RX ADMIN — MONTELUKAST 10 MILLIGRAM(S): 4 TABLET, CHEWABLE ORAL at 12:29

## 2017-06-17 RX ADMIN — POLYETHYLENE GLYCOL 3350 17 GRAM(S): 17 POWDER, FOR SOLUTION ORAL at 12:29

## 2017-06-17 RX ADMIN — OXYCODONE HYDROCHLORIDE 5 MILLIGRAM(S): 5 TABLET ORAL at 23:07

## 2017-06-17 RX ADMIN — Medication 1 TABLET(S): at 05:42

## 2017-06-17 RX ADMIN — Medication 3 MILLILITER(S): at 19:13

## 2017-06-17 RX ADMIN — FAMOTIDINE 20 MILLIGRAM(S): 10 INJECTION INTRAVENOUS at 17:37

## 2017-06-17 RX ADMIN — GABAPENTIN 300 MILLIGRAM(S): 400 CAPSULE ORAL at 14:45

## 2017-06-17 RX ADMIN — Medication 0.25 MILLIGRAM(S): at 14:45

## 2017-06-17 RX ADMIN — Medication 3 MILLILITER(S): at 00:05

## 2017-06-17 RX ADMIN — Medication 50 MILLIGRAM(S): at 05:42

## 2017-06-17 RX ADMIN — HEPARIN SODIUM 5000 UNIT(S): 5000 INJECTION INTRAVENOUS; SUBCUTANEOUS at 17:37

## 2017-06-17 RX ADMIN — OXYCODONE HYDROCHLORIDE 5 MILLIGRAM(S): 5 TABLET ORAL at 22:14

## 2017-06-17 NOTE — PROGRESS NOTE ADULT - SUBJECTIVE AND OBJECTIVE BOX
PULMONARY/CRITICAL CARE      INTERVAL HPI/OVERNIGHT EVENTS: Still wheezing, but feels better. Less sob. Was OOB . Using bipap nitely.    71y FemaleHPI:  71 years old female with history of COPD, Anemia, history of respiratory failure, anxiety, who was at [facility]. Patient was having increasing shortness of breath. She was sent in to ER for same. Patient was in respiratory distress and was placed  on BiPAP. She is admitted for further management.   Patient is currently on BiPAP and continues to c/o shortness of breath.   She denies any chest pain or pressure.  No nausea or vomiting.   No fever or chills.   No dizziness or syncope. (10 Rashawn 2017 14:32)        PAST MEDICAL & SURGICAL HISTORY:  Osteoporosis  Insomnia, unspecified type  Anxiety  Herpes zoster with other complication: Left arm.  Depression  Neuropathy  GERD (gastroesophageal reflux disease)  Chronic obstructive pulmonary disease, unspecified COPD type  Chronic obstructive pulmonary disease with acute lower respiratory infection  No significant past surgical history        ICU Vital Signs Last 24 Hrs  T(C): 36.5, Max: 36.6 (06-16 @ 08:17)  T(F): 97.7, Max: 97.9 (06-16 @ 19:55)  HR: 87 (57 - 97)  BP: 148/76 (125/63 - 178/86)  BP(mean): 97 (82 - 114)  ABP: --  ABP(mean): --  RR: 16 (9 - 20)  SpO2: 97% (97% - 100%)    Qtts:     I&O's Summary      REVIEW OF SYSTEMS:    CONSTITUTIONAL: No fever, weight loss, or fatigue  EYES: No eye pain, visual disturbances, or discharge  ENMT:  No difficulty hearing, tinnitus, vertigo; No sinus or throat pain  NECK: No pain or stiffness  BREASTS: No pain, masses, or nipple discharge  RESPIRATORY: No cough, wheezing, chills or hemoptysis; mild shortness of breath  CARDIOVASCULAR: No chest pain, palpitations, dizziness, or leg swelling  GASTROINTESTINAL: No abdominal or epigastric pain. No nausea, vomiting, or hematemesis; No diarrhea or constipation. No melena or hematochezia.  GENITOURINARY: No dysuria, frequency, hematuria, or incontinence  NEUROLOGICAL: No headaches, memory loss, loss of strength, numbness, or tremors  SKIN: No itching, burning, rashes, or lesions   LYMPH NODES: No enlarged glands  ENDOCRINE: No heat or cold intolerance; No hair loss  MUSCULOSKELETAL: No joint pain or swelling; No muscle, back, or extremity pain, no calf tenderness  PSYCHIATRIC: No depression, anxiety, mood swings, or difficulty sleeping  HEME/LYMPH: No easy bruising, or bleeding gums  ALLERGY AND IMMUNOLOGIC: No hives or eczema      PHYSICAL EXAM:    GENERAL: NAD, well-groomed, well-developed, NAD on bipap  HEAD:  Atraumatic, Normocephalic  EYES: EOMI, PERRLA, conjunctiva and sclera clear  ENMT: No tonsillar erythema, exudates, or enlargement; Moist mucous membranes, Good dentition, No lesions  NECK: Supple, No JVD, Normal thyroid  NERVOUS SYSTEM:  Alert & Oriented X3, Good concentration; Motor Strength 5/5 B/L upper and lower extremities  CHEST/LUNG: Few bilat wheeze, rhonchi. Good excursion. No change percussion, fremitus  HEART: Regular rate and rhythm; No murmurs, rubs, or gallops  ABDOMEN: Soft, Nontender, Nondistended; Bowel sounds present  EXTREMITIES:  2+ Peripheral Pulses, No clubbing, cyanosis, or edema  LYMPH: No lymphadenopathy noted  SKIN: No rashes or lesions          LABS:                        11.6   12.1  )-----------( 188      ( 17 Jun 2017 06:19 )             35.9     06-17    147<H>  |  106  |  24<H>  ----------------------------<  87  3.7   |  40<H>  |  0.60    Ca    8.5      17 Jun 2017 06:19            vanco through     RADIOLOGY & ADDITIONAL STUDIES:      CRITICAL CARE TIME SPENT:

## 2017-06-17 NOTE — PROGRESS NOTE ADULT - ASSESSMENT
71 years old female with history of COPD, Anemia, history of respiratory failure, anxiety, who was at [facility]. Patient was having increasing shortness of breath. She was sent in to ER for same. Patient was in respiratory distress and was placed  on BiPAP. She is found to have respiratory failure. She is being admitted for further care.

## 2017-06-17 NOTE — PROGRESS NOTE ADULT - SUBJECTIVE AND OBJECTIVE BOX
Patient is a 71y old  Female who presents with a chief complaint of Shortness of breath (10 Rashawn 2017 14:32)    HPI:  71 years old female with history of COPD, Anemia, history of respiratory failure, anxiety, who was at [facility]. Patient was having increasing shortness of breath. She was sent in to ER for same. Patient was in respiratory distress and was placed  on BiPAP. She is admitted for further management.   Patient is currently on BiPAP and continues to c/o shortness of breath.   She denies any chest pain or pressure.  No nausea or vomiting.   No fever or chills.   No dizziness or syncope. (10 Rashawn 2017 14:32)    INTERVAL HPI/OVERNIGHT EVENTS:  Chart reviewed, notes reviewed.   Patient seen and examined.  Being followed by following specialists: Pulmonary    Consultant(s) Notes Reviewed:  [X] Yes    Care Discussed with Consultants/Other Providers: [X] Yes  on nasal oxygen during the day.  on BiPAP at night.  Still feels tightness of chest and bronchial tubes.   No chest pain or pressure.   No fever or chills.     REVIEW OF SYSTEMS:  CONSTITUTIONAL: No fever, weight loss, or fatigue EYES: No eye pain, visual disturbances, or discharge ENMT:  No difficulty hearing, tinnitus, vertigo; No sinus or throat pain NECK: No pain or stiffness BREASTS: No pain, masses, or nipple discharge RESPIRATORY: + shortness of breath CARDIOVASCULAR: No chest pain, palpitations, dizziness, or leg swelling GASTROINTESTINAL: No abdominal or epigastric pain. No nausea, vomiting, or hematemesis; No diarrhea or constipation. No melena or hematochezia. GENITOURINARY: No dysuria, frequency, hematuria, or incontinence NEUROLOGICAL: No headaches, memory loss, loss of strength, numbness, or tremors SKIN: No itching, burning, rashes, or lesions  LYMPH NODES: No enlarged glands ENDOCRINE: No heat or cold intolerance; No hair loss; No polydipsia or polyuria MUSCULOSKELETAL: No joint pain or swelling; No muscle, back, or extremity pain PSYCHIATRIC: No depression, anxiety, mood swings, or difficulty sleeping HEME/LYMPH: No easy bruising, or bleeding gums ALLERGY AND IMMUNOLOGIC: No hives or eczema	    Allergies : No Known Allergies    Intolerances    MEDICATIONS  (STANDING):  ALBUTerol/ipratropium for Nebulization 3milliLiter(s) Nebulizer every 6 hours  tiotropium 18 MICROgram(s) Capsule 1Capsule(s) Inhalation daily  heparin  Injectable 5000Unit(s) SubCutaneous every 12 hours  gabapentin 300milliGRAM(s) Oral three times a day  ferrous    sulfate 325milliGRAM(s) Oral daily  polyethylene glycol 3350 17Gram(s) Oral daily  montelukast 10milliGRAM(s) Oral daily  fluticasone propionate 50 MICROgram(s)/spray Nasal Spray 1Spray(s) Both Nostrils daily  artificial tears (preservative free) Ophthalmic Solution 1Drop(s) Both EYES three times a day  calcium carbonate  625 mG + Vitamin D (OsCal 250 + D) 1Tablet(s) Oral two times a day  ALPRAZolam 0.25milliGRAM(s) Oral three times a day  famotidine    Tablet 20milliGRAM(s) Oral two times a day  predniSONE   Tablet 50milliGRAM(s) Oral daily  buDESOnide 160 MICROgram(s)/formoterol 4.5 MICROgram(s) Inhaler 2Puff(s) Inhalation two times a day    MEDICATIONS  (PRN):  acetaminophen   Tablet. 650milliGRAM(s) Oral every 6 hours PRN Mild Pain (1 - 3)  oxyCODONE IR 5milliGRAM(s) Oral every 6 hours PRN Moderate Pain (4 - 6)    ICU Vital Signs Last 24 Hrs  T(C): 36.8, Max: 37.2 (06-17 @ 07:55)  T(F): 98.2, Max: 98.9 (06-17 @ 07:55)  HR: 97 (67 - 98)  BP: 127/74 (119/72 - 170/90)  BP(mean): 87 (82 - 113)  ABP: --  ABP(mean): --  RR: 17 (9 - 22)  SpO2: 95% (95% - 100%)      PHYSICAL EXAM:  GENERAL: On BiPAP, well-groomed, well-developed HEAD:  Atraumatic, Normocephalic EYES: EOMI, PERRLA, conjunctiva and sclera clear ENMT: No tonsillar erythema, exudates, or enlargement; Moist mucous membranes, Good dentition, No lesions NECK: Supple, No JVD, Normal thyroid CHEST/LUNG: B/L decreased breath sounds noted. Exp wheezes + HEART: Regular rate and rhythm; No murmurs, rubs, or gallops ABDOMEN: Soft, Nontender, Nondistended; Bowel sounds present EXTREMITIES:  2+ Peripheral Pulses, No clubbing, cyanosis, or edema MS: No joint swelling or deformity.  LYMPH: No lymphadenopathy noted SKIN: No rashes or lesions NERVOUS SYSTEM:  Motor Strength 3/5 B/L upper and lower extremities; No gross focal deficit noted.  PSYCH:  sleepy but wakes up on calling her name. Oriented to place and person. .	         LABS:                          11.6   12.1  )-----------( 188      ( 17 Jun 2017 06:19 )             35.9     17 Jun 2017 06:19    147    |  106    |  24     ----------------------------<  87     3.7     |  40     |  0.60     Ca    8.5        17 Jun 2017 06:19      CAPILLARY BLOOD GLUCOSE      06-11 [id number] 5.4    06-11 Chol 229<H> <H> HDL 58 Trig 127  Iron Total, Serum: 96 ug/dL (06-13 @ 12:06)    RADIOLOGY TEST: (IMAGES REVIEWED BY ME)  EXAM:  CT CHEST                          PROCEDURE DATE:  06/12/2017      INTERPRETATION:  Clinical information: COPD, difficulty breathing    No prior studies present for comparison    Axial images obtained, coronal and sagittal images computer reformatted.   Intravenous contrast material not administered limiting evaluation of   hilar and mediastinal regions.    Evidence of central lobular emphysema. Parenchymal scarring right   posterior apex. Bilateral pleural thickening posterioraspect both upper   lobes. Bullous disease present most prominent at the right lung base.   Calcified granulomas present multiple locations. No vascular congestion.   No pneumothorax. No pleural effusion.    Minimal atelectasis /scarring, left lung base. Central airway intact.   Thyroid gland not enlarged. Minimal anterior pericardial thickening or   small localized pericardial effusion. Coronary artery calcifications   present. Small anterior mediastinal soft tissue density measures in the   cystic range. No hilar lesions identified, evaluation limited due to lack   of IV contrast. No thoracic aortic aneurysm.    No adrenal lesions. The spleen is not enlarged. Hypodensities in the   right and left hepatic lobes have the appearance of cysts.   Postcholecystectomy clips present. No acute osseous abnormalities.   Bilateral rib fractures with callus formation.    IMPRESSION: Centrilobular emphysema. See additional findings as described   above.  Imaging Personally Reviewed:  [X] YES        HEALTH ISSUES - PROBLEM Dx:  Acute respiratory failure with hypercapnia: Acute respiratory failure with hypercapnia  Need for prophylactic measure: Need for prophylactic measure  Depression, unspecified depression type: Depression, unspecified depression type  Osteoporosis, unspecified osteoporosis type, unspecified pathological fracture presence: Osteoporosis, unspecified osteoporosis type, unspecified pathological fracture presence  Anxiety: Anxiety  Gastroesophageal reflux disease, esophagitis presence not specified: Gastroesophageal reflux disease, esophagitis presence not specified  Acute on chronic respiratory failure with hypercapnia: Acute on chronic respiratory failure with hypercapnia  GERD (gastroesophageal reflux disease): GERD (gastroesophageal reflux disease)  Depression: Depression  COPD exacerbation: COPD exacerbation  Acute respiratory failure: Acute respiratory failure

## 2017-06-18 RX ADMIN — HEPARIN SODIUM 5000 UNIT(S): 5000 INJECTION INTRAVENOUS; SUBCUTANEOUS at 05:36

## 2017-06-18 RX ADMIN — Medication 1 SPRAY(S): at 11:22

## 2017-06-18 RX ADMIN — BUDESONIDE AND FORMOTEROL FUMARATE DIHYDRATE 2 PUFF(S): 160; 4.5 AEROSOL RESPIRATORY (INHALATION) at 17:22

## 2017-06-18 RX ADMIN — Medication 50 MILLIGRAM(S): at 05:36

## 2017-06-18 RX ADMIN — TIOTROPIUM BROMIDE 1 CAPSULE(S): 18 CAPSULE ORAL; RESPIRATORY (INHALATION) at 06:41

## 2017-06-18 RX ADMIN — Medication 1 TABLET(S): at 05:36

## 2017-06-18 RX ADMIN — FAMOTIDINE 20 MILLIGRAM(S): 10 INJECTION INTRAVENOUS at 17:17

## 2017-06-18 RX ADMIN — GABAPENTIN 300 MILLIGRAM(S): 400 CAPSULE ORAL at 13:11

## 2017-06-18 RX ADMIN — MONTELUKAST 10 MILLIGRAM(S): 4 TABLET, CHEWABLE ORAL at 11:22

## 2017-06-18 RX ADMIN — Medication 3 MILLILITER(S): at 13:29

## 2017-06-18 RX ADMIN — Medication 0.25 MILLIGRAM(S): at 05:36

## 2017-06-18 RX ADMIN — Medication 1 DROP(S): at 13:17

## 2017-06-18 RX ADMIN — GABAPENTIN 300 MILLIGRAM(S): 400 CAPSULE ORAL at 21:55

## 2017-06-18 RX ADMIN — Medication 1 DROP(S): at 05:36

## 2017-06-18 RX ADMIN — Medication 650 MILLIGRAM(S): at 22:23

## 2017-06-18 RX ADMIN — FAMOTIDINE 20 MILLIGRAM(S): 10 INJECTION INTRAVENOUS at 05:37

## 2017-06-18 RX ADMIN — GABAPENTIN 300 MILLIGRAM(S): 400 CAPSULE ORAL at 05:37

## 2017-06-18 RX ADMIN — Medication 0.25 MILLIGRAM(S): at 21:55

## 2017-06-18 RX ADMIN — BUDESONIDE AND FORMOTEROL FUMARATE DIHYDRATE 2 PUFF(S): 160; 4.5 AEROSOL RESPIRATORY (INHALATION) at 06:58

## 2017-06-18 RX ADMIN — Medication 325 MILLIGRAM(S): at 11:22

## 2017-06-18 RX ADMIN — HEPARIN SODIUM 5000 UNIT(S): 5000 INJECTION INTRAVENOUS; SUBCUTANEOUS at 17:17

## 2017-06-18 RX ADMIN — Medication 3 MILLILITER(S): at 07:23

## 2017-06-18 RX ADMIN — Medication 3 MILLILITER(S): at 00:52

## 2017-06-18 RX ADMIN — Medication 0.25 MILLIGRAM(S): at 13:11

## 2017-06-18 RX ADMIN — Medication 650 MILLIGRAM(S): at 22:03

## 2017-06-18 RX ADMIN — Medication 1 TABLET(S): at 17:17

## 2017-06-18 RX ADMIN — Medication 3 MILLILITER(S): at 19:24

## 2017-06-18 RX ADMIN — Medication 1 DROP(S): at 21:54

## 2017-06-18 NOTE — PROGRESS NOTE ADULT - SUBJECTIVE AND OBJECTIVE BOX
PULMONARY/CRITICAL CARE      INTERVAL HPI/OVERNIGHT EVENTS:  Less sob. Still wheezing. Some ambulating.    71y FemaleHPI:  71 years old female with history of COPD, Anemia, history of respiratory failure, anxiety, who was at [facility]. Patient was having increasing shortness of breath. She was sent in to ER for same. Patient was in respiratory distress and was placed  on BiPAP. She is admitted for further management.   Patient is currently on BiPAP and continues to c/o shortness of breath.   She denies any chest pain or pressure.  No nausea or vomiting.   No fever or chills.   No dizziness or syncope. (10 Rashawn 2017 14:32)        PAST MEDICAL & SURGICAL HISTORY:  Osteoporosis  Insomnia, unspecified type  Anxiety  Herpes zoster with other complication: Left arm.  Depression  Neuropathy  GERD (gastroesophageal reflux disease)  Chronic obstructive pulmonary disease, unspecified COPD type  Chronic obstructive pulmonary disease with acute lower respiratory infection  No significant past surgical history        ICU Vital Signs Last 24 Hrs  T(C): 36.8, Max: 37.2 (06-17 @ 12:02)  T(F): 98.3, Max: 98.9 (06-17 @ 12:02)  HR: 78 (70 - 98)  BP: 150/76 (116/68 - 150/76)  BP(mean): 87 (85 - 90)  ABP: --  ABP(mean): --  RR: 18 (12 - 22)  SpO2: 98% (95% - 99%)    Qtts:     I&O's Summary    I & Os for current day (as of 18 Jun 2017 08:59)  =============================================  IN: 900 ml / OUT: 1150 ml / NET: -250 ml      REVIEW OF SYSTEMS:    CONSTITUTIONAL: No fever, weight loss, or fatigue  EYES: No eye pain, visual disturbances, or discharge  ENMT:  No difficulty hearing, tinnitus, vertigo; No sinus or throat pain  NECK: No pain or stiffness  BREASTS: No pain, masses, or nipple discharge  RESPIRATORY: No cough Some wheezing, chills or hemoptysis; mild shortness of breath  CARDIOVASCULAR: No chest pain, palpitations, dizziness, or leg swelling  GASTROINTESTINAL: No abdominal or epigastric pain. No nausea, vomiting, or hematemesis; No diarrhea or constipation. No melena or hematochezia.  GENITOURINARY: No dysuria, frequency, hematuria, or incontinence  NEUROLOGICAL: No headaches, memory loss, loss of strength, numbness, or tremors  SKIN: No itching, burning, rashes, or lesions   LYMPH NODES: No enlarged glands  ENDOCRINE: No heat or cold intolerance; No hair loss  MUSCULOSKELETAL: No joint pain or swelling; No muscle, back, or extremity pain, no calf tenderness  PSYCHIATRIC: No depression, anxiety, mood swings, or difficulty sleeping  HEME/LYMPH: No easy bruising, or bleeding gums  ALLERGY AND IMMUNOLOGIC: No hives or eczema      PHYSICAL EXAM:    GENERAL: NAD, well-groomed, well-developed, NAD on bipap  HEAD:  Atraumatic, Normocephalic  EYES: EOMI, PERRLA, conjunctiva and sclera clear  ENMT: No tonsillar erythema, exudates, or enlargement; Moist mucous membranes, Good dentition, No lesions  NECK: Supple, No JVD, Normal thyroid  NERVOUS SYSTEM:  Alert & Oriented X3, Good concentration; Motor Strength 5/5 B/L upper and lower extremities  CHEST/LUNG: Few bilat wheeze, rhonchi. Good excursion. No change percussion, fremitus  HEART: Regular rate and rhythm; No murmurs, rubs, or gallops  ABDOMEN: Soft, Nontender, Nondistended; Bowel sounds present  EXTREMITIES:  2+ Peripheral Pulses, No clubbing, cyanosis, or edema  LYMPH: No lymphadenopathy noted  SKIN: No rashes or lesions                LABS:                        11.6   12.1  )-----------( 188      ( 17 Jun 2017 06:19 )             35.9     06-17    147<H>  |  106  |  24<H>  ----------------------------<  87  3.7   |  40<H>  |  0.60    Ca    8.5      17 Jun 2017 06:19            vanco through     RADIOLOGY & ADDITIONAL STUDIES:      CRITICAL CARE TIME SPENT:

## 2017-06-18 NOTE — PROGRESS NOTE ADULT - ASSESSMENT
71 years old female with history of COPD, Anemia, history of respiratory failure, anxiety, who was at [facility]. Patient was having increasing shortness of breath. She was sent in to ER for same. Patient was in respiratory distress and was placed  on BiPAP. She is found to have respiratory failure. She is being admitted for further care.

## 2017-06-18 NOTE — PROGRESS NOTE ADULT - SUBJECTIVE AND OBJECTIVE BOX
Patient is a 71y old  Female who presents with a chief complaint of Shortness of breath (10 Rashawn 2017 14:32)    HPI:  71 years old female with history of COPD, Anemia, history of respiratory failure, anxiety, who was at [facility]. Patient was having increasing shortness of breath. She was sent in to ER for same. Patient was in respiratory distress and was placed  on BiPAP. She is admitted for further management.   Patient is currently on BiPAP and continues to c/o shortness of breath.   She denies any chest pain or pressure.  No nausea or vomiting.   No fever or chills.   No dizziness or syncope. (10 Rashawn 2017 14:32)    INTERVAL HPI/OVERNIGHT EVENTS:  Chart reviewed, notes reviewed.   Patient seen and examined.  Being followed by following specialists: Pulmonary    Consultant(s) Notes Reviewed:  [X] Yes    Care Discussed with Consultants/Other Providers: [X] Yes  on nasal oxygen during the day.  on BiPAP at night.  Feeling better. Still has some cough.   No chest pain or pressure.   No fever or chills.     REVIEW OF SYSTEMS:  CONSTITUTIONAL: No fever, weight loss, or fatigue EYES: No eye pain, visual disturbances, or discharge ENMT:  No difficulty hearing, tinnitus, vertigo; No sinus or throat pain NECK: No pain or stiffness BREASTS: No pain, masses, or nipple discharge RESPIRATORY: + shortness of breath CARDIOVASCULAR: No chest pain, palpitations, dizziness, or leg swelling GASTROINTESTINAL: No abdominal or epigastric pain. No nausea, vomiting, or hematemesis; No diarrhea or constipation. No melena or hematochezia. GENITOURINARY: No dysuria, frequency, hematuria, or incontinence NEUROLOGICAL: No headaches, memory loss, loss of strength, numbness, or tremors SKIN: No itching, burning, rashes, or lesions  LYMPH NODES: No enlarged glands ENDOCRINE: No heat or cold intolerance; No hair loss; No polydipsia or polyuria MUSCULOSKELETAL: No joint pain or swelling; No muscle, back, or extremity pain PSYCHIATRIC: No depression, anxiety, mood swings, or difficulty sleeping HEME/LYMPH: No easy bruising, or bleeding gums ALLERGY AND IMMUNOLOGIC: No hives or eczema	    Allergies : No Known Allergies    Intolerances    MEDICATIONS  (STANDING):  ALBUTerol/ipratropium for Nebulization 3milliLiter(s) Nebulizer every 6 hours  tiotropium 18 MICROgram(s) Capsule 1Capsule(s) Inhalation daily  heparin  Injectable 5000Unit(s) SubCutaneous every 12 hours  gabapentin 300milliGRAM(s) Oral three times a day  ferrous    sulfate 325milliGRAM(s) Oral daily  polyethylene glycol 3350 17Gram(s) Oral daily  montelukast 10milliGRAM(s) Oral daily  fluticasone propionate 50 MICROgram(s)/spray Nasal Spray 1Spray(s) Both Nostrils daily  artificial tears (preservative free) Ophthalmic Solution 1Drop(s) Both EYES three times a day  calcium carbonate  625 mG + Vitamin D (OsCal 250 + D) 1Tablet(s) Oral two times a day  ALPRAZolam 0.25milliGRAM(s) Oral three times a day  famotidine    Tablet 20milliGRAM(s) Oral two times a day  predniSONE   Tablet 50milliGRAM(s) Oral daily  buDESOnide 160 MICROgram(s)/formoterol 4.5 MICROgram(s) Inhaler 2Puff(s) Inhalation two times a day    MEDICATIONS  (PRN):  acetaminophen   Tablet. 650milliGRAM(s) Oral every 6 hours PRN Mild Pain (1 - 3)    Vital Signs Last 24 Hrs  T(C): 36.7, Max: 36.8 (06-17 @ 16:00)  T(F): 98, Max: 98.3 (06-17 @ 16:00)  HR: 96 (74 - 97)  BP: 118/61 (109/68 - 150/76)  BP(mean): 87 (87 - 87)  RR: 20 (14 - 20)  SpO2: 98% (94% - 99%)    PHYSICAL EXAM:  GENERAL: On BiPAP, well-groomed, well-developed HEAD:  Atraumatic, Normocephalic EYES: EOMI, PERRLA, conjunctiva and sclera clear ENMT: No tonsillar erythema, exudates, or enlargement; Moist mucous membranes, Good dentition, No lesions NECK: Supple, No JVD, Normal thyroid CHEST/LUNG: B/L decreased breath sounds noted. Exp wheezes + HEART: Regular rate and rhythm; No murmurs, rubs, or gallops ABDOMEN: Soft, Nontender, Nondistended; Bowel sounds present EXTREMITIES:  2+ Peripheral Pulses, No clubbing, cyanosis, or edema MS: No joint swelling or deformity.  LYMPH: No lymphadenopathy noted SKIN: No rashes or lesions NERVOUS SYSTEM:  Motor Strength 3/5 B/L upper and lower extremities; No gross focal deficit noted.  PSYCH:  sleepy but wakes up on calling her name. Oriented to place and person. .	         LABS:                                         11.6   12.1  )-----------( 188      ( 17 Jun 2017 06:19 )             35.9     17 Jun 2017 06:19    147    |  106    |  24     ----------------------------<  87     3.7     |  40     |  0.60     Ca    8.5        17 Jun 2017 06:19      CAPILLARY BLOOD GLUCOSE      06-11 [id number] 5.4    06-11 Chol 229<H> <H> HDL 58 Trig 127  Iron Total, Serum: 96 ug/dL (06-13 @ 12:06)    Thyroid Stimulating Hormone, Serum: 0.20 uIU/mL (06-11 @ 11:57)    RADIOLOGY TEST: (IMAGES REVIEWED BY ME)  EXAM:  CT CHEST                          PROCEDURE DATE:  06/12/2017      INTERPRETATION:  Clinical information: COPD, difficulty breathing    No prior studies present for comparison    Axial images obtained, coronal and sagittal images computer reformatted.   Intravenous contrast material not administered limiting evaluation of   hilar and mediastinal regions.    Evidence of central lobular emphysema. Parenchymal scarring right   posterior apex. Bilateral pleural thickening posterioraspect both upper   lobes. Bullous disease present most prominent at the right lung base.   Calcified granulomas present multiple locations. No vascular congestion.   No pneumothorax. No pleural effusion.    Minimal atelectasis /scarring, left lung base. Central airway intact.   Thyroid gland not enlarged. Minimal anterior pericardial thickening or   small localized pericardial effusion. Coronary artery calcifications   present. Small anterior mediastinal soft tissue density measures in the   cystic range. No hilar lesions identified, evaluation limited due to lack   of IV contrast. No thoracic aortic aneurysm.    No adrenal lesions. The spleen is not enlarged. Hypodensities in the   right and left hepatic lobes have the appearance of cysts.   Postcholecystectomy clips present. No acute osseous abnormalities.   Bilateral rib fractures with callus formation.    IMPRESSION: Centrilobular emphysema. See additional findings as described   above.  Imaging Personally Reviewed:  [X] YES        HEALTH ISSUES - PROBLEM Dx:  Acute respiratory failure with hypercapnia: Acute respiratory failure with hypercapnia  Need for prophylactic measure: Need for prophylactic measure  Depression, unspecified depression type: Depression, unspecified depression type  Osteoporosis, unspecified osteoporosis type, unspecified pathological fracture presence: Osteoporosis, unspecified osteoporosis type, unspecified pathological fracture presence  Anxiety: Anxiety  Gastroesophageal reflux disease, esophagitis presence not specified: Gastroesophageal reflux disease, esophagitis presence not specified  Acute on chronic respiratory failure with hypercapnia: Acute on chronic respiratory failure with hypercapnia  GERD (gastroesophageal reflux disease): GERD (gastroesophageal reflux disease)  Depression: Depression  COPD exacerbation: COPD exacerbation  Acute respiratory failure: Acute respiratory failure

## 2017-06-19 VITALS
RESPIRATION RATE: 20 BRPM | OXYGEN SATURATION: 97 % | TEMPERATURE: 98 F | DIASTOLIC BLOOD PRESSURE: 78 MMHG | SYSTOLIC BLOOD PRESSURE: 118 MMHG | HEART RATE: 86 BPM

## 2017-06-19 PROCEDURE — 87040 BLOOD CULTURE FOR BACTERIA: CPT

## 2017-06-19 PROCEDURE — 80053 COMPREHEN METABOLIC PANEL: CPT

## 2017-06-19 PROCEDURE — 80048 BASIC METABOLIC PNL TOTAL CA: CPT

## 2017-06-19 PROCEDURE — 82803 BLOOD GASES ANY COMBINATION: CPT

## 2017-06-19 PROCEDURE — 87086 URINE CULTURE/COLONY COUNT: CPT

## 2017-06-19 PROCEDURE — 84484 ASSAY OF TROPONIN QUANT: CPT

## 2017-06-19 PROCEDURE — 84436 ASSAY OF TOTAL THYROXINE: CPT

## 2017-06-19 PROCEDURE — 94640 AIRWAY INHALATION TREATMENT: CPT

## 2017-06-19 PROCEDURE — 97116 GAIT TRAINING THERAPY: CPT

## 2017-06-19 PROCEDURE — 82550 ASSAY OF CK (CPK): CPT

## 2017-06-19 PROCEDURE — 83605 ASSAY OF LACTIC ACID: CPT

## 2017-06-19 PROCEDURE — 82553 CREATINE MB FRACTION: CPT

## 2017-06-19 PROCEDURE — 99285 EMERGENCY DEPT VISIT HI MDM: CPT | Mod: 25

## 2017-06-19 PROCEDURE — 85730 THROMBOPLASTIN TIME PARTIAL: CPT

## 2017-06-19 PROCEDURE — 85027 COMPLETE CBC AUTOMATED: CPT

## 2017-06-19 PROCEDURE — 96374 THER/PROPH/DIAG INJ IV PUSH: CPT

## 2017-06-19 PROCEDURE — 81001 URINALYSIS AUTO W/SCOPE: CPT

## 2017-06-19 PROCEDURE — 84145 PROCALCITONIN (PCT): CPT

## 2017-06-19 PROCEDURE — 83036 HEMOGLOBIN GLYCOSYLATED A1C: CPT

## 2017-06-19 PROCEDURE — 84443 ASSAY THYROID STIM HORMONE: CPT

## 2017-06-19 PROCEDURE — 80061 LIPID PANEL: CPT

## 2017-06-19 PROCEDURE — 96375 TX/PRO/DX INJ NEW DRUG ADDON: CPT

## 2017-06-19 PROCEDURE — 97161 PT EVAL LOW COMPLEX 20 MIN: CPT

## 2017-06-19 PROCEDURE — 71045 X-RAY EXAM CHEST 1 VIEW: CPT

## 2017-06-19 PROCEDURE — 93306 TTE W/DOPPLER COMPLETE: CPT

## 2017-06-19 PROCEDURE — 83540 ASSAY OF IRON: CPT

## 2017-06-19 PROCEDURE — 85610 PROTHROMBIN TIME: CPT

## 2017-06-19 PROCEDURE — 84480 ASSAY TRIIODOTHYRONINE (T3): CPT

## 2017-06-19 PROCEDURE — 83735 ASSAY OF MAGNESIUM: CPT

## 2017-06-19 PROCEDURE — 94664 DEMO&/EVAL PT USE INHALER: CPT

## 2017-06-19 PROCEDURE — 97110 THERAPEUTIC EXERCISES: CPT

## 2017-06-19 PROCEDURE — 82746 ASSAY OF FOLIC ACID SERUM: CPT

## 2017-06-19 PROCEDURE — 71250 CT THORAX DX C-: CPT

## 2017-06-19 PROCEDURE — 94660 CPAP INITIATION&MGMT: CPT

## 2017-06-19 PROCEDURE — 93005 ELECTROCARDIOGRAM TRACING: CPT

## 2017-06-19 PROCEDURE — 84100 ASSAY OF PHOSPHORUS: CPT

## 2017-06-19 PROCEDURE — 36600 WITHDRAWAL OF ARTERIAL BLOOD: CPT

## 2017-06-19 PROCEDURE — 82607 VITAMIN B-12: CPT

## 2017-06-19 RX ORDER — ALPRAZOLAM 0.25 MG
1 TABLET ORAL
Qty: 0 | Refills: 0 | DISCHARGE
Start: 2017-06-19

## 2017-06-19 RX ORDER — BUDESONIDE AND FORMOTEROL FUMARATE DIHYDRATE 160; 4.5 UG/1; UG/1
2 AEROSOL RESPIRATORY (INHALATION)
Qty: 0 | Refills: 0 | DISCHARGE
Start: 2017-06-19

## 2017-06-19 RX ORDER — TIOTROPIUM BROMIDE 18 UG/1
1 CAPSULE ORAL; RESPIRATORY (INHALATION)
Qty: 0 | Refills: 0 | DISCHARGE
Start: 2017-06-19

## 2017-06-19 RX ADMIN — Medication 3 MILLILITER(S): at 14:03

## 2017-06-19 RX ADMIN — Medication 325 MILLIGRAM(S): at 11:26

## 2017-06-19 RX ADMIN — Medication 3 MILLILITER(S): at 07:54

## 2017-06-19 RX ADMIN — HEPARIN SODIUM 5000 UNIT(S): 5000 INJECTION INTRAVENOUS; SUBCUTANEOUS at 06:14

## 2017-06-19 RX ADMIN — Medication 0.25 MILLIGRAM(S): at 14:20

## 2017-06-19 RX ADMIN — Medication 50 MILLIGRAM(S): at 06:14

## 2017-06-19 RX ADMIN — Medication 1 SPRAY(S): at 11:26

## 2017-06-19 RX ADMIN — Medication 3 MILLILITER(S): at 01:29

## 2017-06-19 RX ADMIN — Medication 1 DROP(S): at 14:20

## 2017-06-19 RX ADMIN — Medication 1 DROP(S): at 06:15

## 2017-06-19 RX ADMIN — MONTELUKAST 10 MILLIGRAM(S): 4 TABLET, CHEWABLE ORAL at 11:26

## 2017-06-19 RX ADMIN — GABAPENTIN 300 MILLIGRAM(S): 400 CAPSULE ORAL at 06:14

## 2017-06-19 RX ADMIN — Medication 1 TABLET(S): at 06:14

## 2017-06-19 RX ADMIN — GABAPENTIN 300 MILLIGRAM(S): 400 CAPSULE ORAL at 14:20

## 2017-06-19 RX ADMIN — TIOTROPIUM BROMIDE 1 CAPSULE(S): 18 CAPSULE ORAL; RESPIRATORY (INHALATION) at 06:46

## 2017-06-19 RX ADMIN — FAMOTIDINE 20 MILLIGRAM(S): 10 INJECTION INTRAVENOUS at 06:14

## 2017-06-19 RX ADMIN — BUDESONIDE AND FORMOTEROL FUMARATE DIHYDRATE 2 PUFF(S): 160; 4.5 AEROSOL RESPIRATORY (INHALATION) at 06:47

## 2017-06-19 RX ADMIN — Medication 0.25 MILLIGRAM(S): at 06:14

## 2017-06-19 NOTE — PROGRESS NOTE ADULT - ASSESSMENT
71 years old female with history of COPD, Anemia, history of respiratory failure, anxiety, who was at [facility]. Patient was having increasing shortness of breath. She was sent in to ER for same. Patient was in respiratory distress and was placed  on BiPAP. She is found to have respiratory failure. She is being admitted for further care.

## 2017-06-19 NOTE — PROGRESS NOTE ADULT - NSHPATTENDINGPLANDISCUSS_GEN_ALL_CORE
nursing rt in detail
Jimenez Salas in detail
Nursing in detail
pa in detail
Patient
nursing
patient and nursing.

## 2017-06-19 NOTE — PROGRESS NOTE ADULT - PROBLEM SELECTOR PLAN 6
Supportive care.

## 2017-06-19 NOTE — DISCHARGE NOTE ADULT - CARE PROVIDER_API CALL
Sola Anderson (DO), Internal Medicine  [address] [place] [address]  Phone: [phone]  Fax: [phone]

## 2017-06-19 NOTE — PROGRESS NOTE ADULT - PROBLEM SELECTOR PLAN 3
Will change Pepcid to PO.
Continue Pepcid PO
Continue patient on Pepcid IV.  Supportive care.
Continue patient on Pepcid IV.  Supportive care.
Will change Pepcid to PO.
continue Xanax

## 2017-06-19 NOTE — PROGRESS NOTE ADULT - PROBLEM SELECTOR PLAN 4
Continue patient on Xanax 3 times a day at patient request.  monitor patient's pulmonary status.
Continue patient on Xanax 3 times a day at patient request.
Continue patient on Xanax 3 times a day at patient request.  monitor patient's pulmonary status.
Continue patient on Xanax as per out patient dose.
Continue patient on Xanax.  Will increase to 3 times a day at patient request.  monitor patient's pulmonary status.

## 2017-06-19 NOTE — DISCHARGE NOTE ADULT - HOSPITAL COURSE
Patient admitted with acute on chronic respiratory failure with hypercapnia and hypoxemia. She also had bronchitis. Patient was treated with IV Steroids, BiPAP, bronchodilators and antibiotics. Patient slowly improved. Patient was slowly weaned off BiPAP during the day. She is doing better and is being discharged to [facility] for LTC. She is advised slow taper from steroids. Her prognosis remains very guarded.

## 2017-06-19 NOTE — DISCHARGE NOTE ADULT - NS AS ACTIVITY OBS
No Heavy lifting/straining/Do not make important decisions/Do not drive or operate machinery/Bathing allowed/Showering allowed/Walking-Indoors allowed/Walking-Outdoors allowed/Stairs allowed

## 2017-06-19 NOTE — DISCHARGE NOTE ADULT - MEDICATION SUMMARY - MEDICATIONS TO TAKE
I will START or STAY ON the medications listed below when I get home from the hospital:    predniSONE 50 mg oral tablet  -- 1 tab(s) by mouth once a day for 4 days then 40 mg daily for 5 days then 30 mg daily for 5 days then 20 mg daily for 5 days then 10 mg daily.   -- Indication: For COPD exacerbation    Tylenol 325 mg oral tablet  -- 2 tab(s) by mouth every 4 hours  -- Indication: For Pain    oxyCODONE 5 mg oral capsule  -- 1 cap(s) by mouth every 6 hours  -- Indication: For Pain    gabapentin 300 mg oral capsule  -- 1 cap(s) by mouth 3 times a day  -- Indication: For Pain    traZODone  -- 25 milligram(s) by mouth once a day (at bedtime)  -- Indication: For Insomnia, unspecified type    ALPRAZolam 0.25 mg oral tablet  -- 1 tab(s) by mouth 3 times a day  -- Indication: For Anxiety    Fosamax 70 mg oral tablet  -- 1 tab(s) by mouth once a week  -- Indication: For Osteoporosis    Combivent 18 mcg-103 mcg-/inh inhalation aerosol  --  inhaled 4 times a day  -- Indication: For COPD exacerbation    Symbicort 160 mcg-4.5 mcg/inh inhalation aerosol  -- 2 puff(s) inhaled 2 times a day  -- Indication: For COPD exacerbation    tiotropium 18 mcg inhalation capsule  -- 1 cap(s) inhaled once a day  -- Indication: For COPD exacerbation    albuterol-ipratropium 2.5 mg-0.5 mg/3 mL inhalation solution  -- 3 milliliter(s) inhaled every 6 hours  -- Indication: For COPD exacerbation    Pepcid AC  -- 40  by mouth once a day (at bedtime)  -- Indication: For GERD (gastroesophageal reflux disease)    ferrous sulfate 324 mg (65 mg elemental iron) oral delayed release tablet  --  by mouth once a day  -- Indication: For Anemia    MiraLax oral powder for reconstitution  --  by mouth   -- Indication: For COnstipation    Singulair 10 mg oral tablet  -- 1 tab(s) by mouth once a day  -- Indication: For COPD exacerbation    Flonase 50 mcg/inh nasal spray  -- 1 spray(s) into nose once a day  -- Indication: For Nasal congestion    Artificial Tears ophthalmic solution  -- 1 drop(s) to each affected eye 2 times a day  -- Indication: For Dry eyes    Os-Sathish 250 with D  --   2 times a day  -- Indication: For Supplement

## 2017-06-19 NOTE — PROGRESS NOTE ADULT - PROBLEM SELECTOR PROBLEM 1
Acute on chronic respiratory failure with hypercapnia
Acute respiratory failure with hypercapnia
Acute on chronic respiratory failure with hypercapnia
Acute respiratory failure
Acute respiratory failure with hypercapnia
Acute respiratory failure

## 2017-06-19 NOTE — PROGRESS NOTE ADULT - PROBLEM SELECTOR PROBLEM 6
Depression, unspecified depression type

## 2017-06-19 NOTE — PROGRESS NOTE ADULT - PROBLEM SELECTOR PROBLEM 5
Osteoporosis, unspecified osteoporosis type, unspecified pathological fracture presence

## 2017-06-19 NOTE — PROGRESS NOTE ADULT - PROBLEM SELECTOR PLAN 2
Patient with COPD exacerbation.  Continue on IV steroids and bronchodilators.   Wean steroids once OK with Pulmonary.   Further management as per patient's clinical course.
Inhalers
Inhalers  OOB
Inhalers  OOB/ambulate
Inhalers  OOB/ambulate
Inhalers  OOB/ambulate  D/c planning
Inhalers  OOB/ambulate  D/c planning
Patient with COPD exacerbation, improved.  Wean steroids slowly at [facility].  Continue Symbicort/Spiriva and DuoNeb
Patient with COPD exacerbation.  Continue on IV steroids and bronchodilators.   Pulmonary consult noted and appreciated.  Further management as per patient's clinical course.
Patient with COPD exacerbation.  Continue on IV steroids and bronchodilators.   Wean steroids once OK with Pulmonary.   Further management as per patient's clinical course.
Patient with COPD exacerbation.  Continue on IV steroids and bronchodilators.   Wean steroids once OK with Pulmonary.   Further management as per patient's clinical course.
Patient with COPD exacerbation.  Continue on PO steroids and bronchodilators.   Wean steroids as per Pulmonary.   Further management as per patient's clinical course.
Continue on IV steroids and bronchodilators.  maintain oxygen saturation greater than 90 %

## 2017-06-19 NOTE — PROGRESS NOTE ADULT - ASSESSMENT
Pt with O2 dependent COPD c/o increasing sob for few days .  Acute Hypercarbic respiratory failure , now on bipap. No evidence bacterial infection. Still wheezing, but significantly less hypercarbic.  Bullous emphysema.  Slow improvement  Should be stable for d/c.

## 2017-06-19 NOTE — PROGRESS NOTE ADULT - PROBLEM SELECTOR PROBLEM 2
COPD exacerbation

## 2017-06-19 NOTE — PROGRESS NOTE ADULT - PROBLEM SELECTOR PROBLEM 4
GERD (gastroesophageal reflux disease)
Anxiety
GERD (gastroesophageal reflux disease)
Anxiety

## 2017-06-19 NOTE — PROGRESS NOTE ADULT - SUBJECTIVE AND OBJECTIVE BOX
Patient is a 71y old  Female who presents with a chief complaint of Shortness of breath (10 Rashawn 2017 14:32)    HPI:  71 years old female with history of COPD, Anemia, history of respiratory failure, anxiety, who was at [facility]. Patient was having increasing shortness of breath. She was sent in to ER for same. Patient was in respiratory distress and was placed  on BiPAP. She is admitted for further management.   Patient is currently on BiPAP and continues to c/o shortness of breath.   She denies any chest pain or pressure.  No nausea or vomiting.   No fever or chills.   No dizziness or syncope. (10 Rashawn 2017 14:32)    INTERVAL HPI/OVERNIGHT EVENTS:  Chart reviewed, notes reviewed.   Patient seen and examined.  Being followed by following specialists: Pulmonary    Consultant(s) Notes Reviewed:  [X] Yes    Care Discussed with Consultants/Other Providers: [X] Yes  on nasal oxygen during the day.  on BiPAP at night.  Feeling better. Still has some cough.   No chest pain or pressure.   No fever or chills.     REVIEW OF SYSTEMS:  CONSTITUTIONAL: No fever, weight loss, or fatigue EYES: No eye pain, visual disturbances, or discharge ENMT:  No difficulty hearing, tinnitus, vertigo; No sinus or throat pain NECK: No pain or stiffness BREASTS: No pain, masses, or nipple discharge RESPIRATORY: + shortness of breath CARDIOVASCULAR: No chest pain, palpitations, dizziness, or leg swelling GASTROINTESTINAL: No abdominal or epigastric pain. No nausea, vomiting, or hematemesis; No diarrhea or constipation. No melena or hematochezia. GENITOURINARY: No dysuria, frequency, hematuria, or incontinence NEUROLOGICAL: No headaches, memory loss, loss of strength, numbness, or tremors SKIN: No itching, burning, rashes, or lesions  LYMPH NODES: No enlarged glands ENDOCRINE: No heat or cold intolerance; No hair loss; No polydipsia or polyuria MUSCULOSKELETAL: No joint pain or swelling; No muscle, back, or extremity pain PSYCHIATRIC: No depression, anxiety, mood swings, or difficulty sleeping HEME/LYMPH: No easy bruising, or bleeding gums ALLERGY AND IMMUNOLOGIC: No hives or eczema	    Allergies : No Known Allergies    Intolerances    MEDICATIONS  (STANDING):  ALBUTerol/ipratropium for Nebulization 3milliLiter(s) Nebulizer every 6 hours  tiotropium 18 MICROgram(s) Capsule 1Capsule(s) Inhalation daily  heparin  Injectable 5000Unit(s) SubCutaneous every 12 hours  gabapentin 300milliGRAM(s) Oral three times a day  ferrous    sulfate 325milliGRAM(s) Oral daily  polyethylene glycol 3350 17Gram(s) Oral daily  montelukast 10milliGRAM(s) Oral daily  fluticasone propionate 50 MICROgram(s)/spray Nasal Spray 1Spray(s) Both Nostrils daily  artificial tears (preservative free) Ophthalmic Solution 1Drop(s) Both EYES three times a day  calcium carbonate  625 mG + Vitamin D (OsCal 250 + D) 1Tablet(s) Oral two times a day  ALPRAZolam 0.25milliGRAM(s) Oral three times a day  famotidine    Tablet 20milliGRAM(s) Oral two times a day  predniSONE   Tablet 50milliGRAM(s) Oral daily  buDESOnide 160 MICROgram(s)/formoterol 4.5 MICROgram(s) Inhaler 2Puff(s) Inhalation two times a day    MEDICATIONS  (PRN):  acetaminophen   Tablet. 650milliGRAM(s) Oral every 6 hours PRN Mild Pain (1 - 3)    Vital Signs Last 24 Hrs  T(C): 36.9, Max: 37 (06-18 @ 21:30)  T(F): 98.5, Max: 98.6 (06-18 @ 21:30)  HR: 72 (59 - 86)  BP: 118/78 (110/63 - 138/75)  BP(mean): --  RR: 20 (18 - 20)  SpO2: 97% (92% - 99%)    PHYSICAL EXAM:  GENERAL: Off BiPAP, well-groomed, well-developed HEAD:  Atraumatic, Normocephalic EYES: EOMI, PERRLA, conjunctiva and sclera clear ENMT: No tonsillar erythema, exudates, or enlargement; Moist mucous membranes, Good dentition, No lesions NECK: Supple, No JVD, Normal thyroid CHEST/LUNG: B/L decreased breath sounds noted. Exp wheezes + HEART: Regular rate and rhythm; No murmurs, rubs, or gallops ABDOMEN: Soft, Nontender, Nondistended; Bowel sounds present EXTREMITIES:  2+ Peripheral Pulses, No clubbing, cyanosis, or edema MS: No joint swelling or deformity.  LYMPH: No lymphadenopathy noted SKIN: No rashes or lesions NERVOUS SYSTEM:  Motor Strength 3/5 B/L upper and lower extremities; No gross focal deficit noted.  PSYCH:  sleepy but wakes up on calling her name. Oriented to place and person. .	         LABS:     06-11 GluqlspnpdV5G 5.4    06-11 Chol 229<H> <H> HDL 58 Trig 127  Iron Total, Serum: 96 ug/dL (06-13 @ 12:06)    Thyroid Stimulating Hormone, Serum: 0.20 uIU/mL (06-11 @ 11:57)    RADIOLOGY TEST: (IMAGES REVIEWED BY ME)  EXAM:  CT CHEST                          PROCEDURE DATE:  06/12/2017      INTERPRETATION:  Clinical information: COPD, difficulty breathing    No prior studies present for comparison    Axial images obtained, coronal and sagittal images computer reformatted.   Intravenous contrast material not administered limiting evaluation of   hilar and mediastinal regions.    Evidence of central lobular emphysema. Parenchymal scarring right   posterior apex. Bilateral pleural thickening posterioraspect both upper   lobes. Bullous disease present most prominent at the right lung base.   Calcified granulomas present multiple locations. No vascular congestion.   No pneumothorax. No pleural effusion.    Minimal atelectasis /scarring, left lung base. Central airway intact.   Thyroid gland not enlarged. Minimal anterior pericardial thickening or   small localized pericardial effusion. Coronary artery calcifications   present. Small anterior mediastinal soft tissue density measures in the   cystic range. No hilar lesions identified, evaluation limited due to lack   of IV contrast. No thoracic aortic aneurysm.    No adrenal lesions. The spleen is not enlarged. Hypodensities in the   right and left hepatic lobes have the appearance of cysts.   Postcholecystectomy clips present. No acute osseous abnormalities.   Bilateral rib fractures with callus formation.    IMPRESSION: Centrilobular emphysema. See additional findings as described   above.  Imaging Personally Reviewed:  [X] YES        HEALTH ISSUES - PROBLEM Dx:  Acute respiratory failure with hypercapnia: Acute respiratory failure with hypercapnia  Need for prophylactic measure: Need for prophylactic measure  Depression, unspecified depression type: Depression, unspecified depression type  Osteoporosis, unspecified osteoporosis type, unspecified pathological fracture presence: Osteoporosis, unspecified osteoporosis type, unspecified pathological fracture presence  Anxiety: Anxiety  Gastroesophageal reflux disease, esophagitis presence not specified: Gastroesophageal reflux disease, esophagitis presence not specified  Acute on chronic respiratory failure with hypercapnia: Acute on chronic respiratory failure with hypercapnia  GERD (gastroesophageal reflux disease): GERD (gastroesophageal reflux disease)  Depression: Depression  COPD exacerbation: COPD exacerbation  Acute respiratory failure: Acute respiratory failure

## 2017-06-19 NOTE — DISCHARGE NOTE ADULT - PATIENT PORTAL LINK FT
“You can access the [facility] Patient Portal, offered by [facility], by registering with the following website: [email or web address]”

## 2017-06-19 NOTE — PROGRESS NOTE ADULT - PROBLEM SELECTOR PROBLEM 3
Depression
Gastroesophageal reflux disease, esophagitis presence not specified
Depression
Gastroesophageal reflux disease, esophagitis presence not specified
Anxiety

## 2017-06-19 NOTE — DISCHARGE NOTE ADULT - PLAN OF CARE
Breath better. Mechanical soft with nectar thick liquids.  Increase activity at [facility].  Fall precautions.  BiPAP at 14/5 with 35% FiO2 at night and as needed during the day.  Follow up with Dr. Anderson at [facility].

## 2017-06-19 NOTE — PROGRESS NOTE ADULT - PROBLEM SELECTOR PLAN 1
-on BiPAP currently, will titrate for SaO2 >90%  -off antibiotics  -nebs/steroids
Patient with acute on chronic respiratory failure with hypercapnia, POA.  Will continue patient on BiPAP as per pulmonary/CC.  Wean from BiPAP as tolerated.   Further management as per patient's clinical course.
-has improved and is currently off BiPAP  -likely will continue with BiPAP at bedtime and PRN  -if remains off BiPAP could likely transfer out of [facility]
-remained off BiPAp today  -will have patient use BiPAP nocturnal, goal to maintain NC during daytime  -off antibiotics  -nebs/steroids
Continue bipap  Steroids   HHN
Continue bipap at nite--nasal O2 during day.  Slow taper steroids.  HHN
Continue bipap at nite--nasal O2 during day.  Transfer to [address] [place] [address]
Continue bipap at nite--trial nasal O2 during day.  Taper Steroids   HHN
Continue bipap at nite--trial nasal O2 during day.  Taper Steroids   HHN
Continue bipap at nite--trial nasal O2 during day.  Transfer to [address] [place] [address]
Continue bipap--trial nasal O2  Steroids   HHN
Patient with acute on chronic respiratory failure with hypercapnia, POA.  Will continue patient on BiPAP as per pulmonary/CC.  Monitor pulse ox and ABG as per pulmonary.  Further management as per patient's clinical course.
Patient with acute on chronic respiratory failure with hypercapnia, POA.  Will continue patient on BiPAP as per pulmonary/CC.  Wean from BiPAP as tolerated.   Further management as per patient's clinical course.
Patient with acute on chronic respiratory failure with hypercapnia, POA.  improved.  Continue BiPAP at night and prn at [facility].
continue BIPAP overnight  will try off BIPAP in morning  repeat ABG   monitor for increased SOB
Continue bipap  Steroids   HHN
Continue bipap  Steroids   HHN

## 2017-06-19 NOTE — PROGRESS NOTE ADULT - ATTENDING COMMENTS
Discharge planning.
Prognosis guarded.  Weaning from BiPAP as per Pulmonary.   Monitor labs.  Awaiting Speech eval, will advance diet to mechanical soft diet with nectar thick liquids.  Aspiration precautions.
Prognosis guarded.  Weaning from BiPAP as per Pulmonary.   PT eval.   Monitor labs.  Aspiration precautions.
Prognosis guarded.  Weaning from BiPAP as per Pulmonary.   PT eval.   Monitor labs.  Aspiration precautions.
Prognosis guarded.  Weaning from BiPAP as per Pulmonary.   PT eval.   Monitor labs.  Aspiration precautions.  Discharge planning.  Will benefit from BiPAP at NH
patient is stable for discharge to [facility].  Her prognosis is very guarded.   Patient is very high risk for readmission.  I spent more than 35 minutes on discharging the patient.
Discharge planning.

## 2017-06-19 NOTE — DISCHARGE NOTE ADULT - MEDICATION SUMMARY - MEDICATIONS TO CHANGE
I will SWITCH the dose or number of times a day I take the medications listed below when I get home from the hospital:    Xanax 0.25 mg oral tablet  --  by mouth once a day    predniSONE 10 mg oral tablet  -- 1 tab(s) by mouth once a day

## 2017-06-19 NOTE — PROGRESS NOTE ADULT - SUBJECTIVE AND OBJECTIVE BOX
PULMONARY/CRITICAL CARE      INTERVAL HPI/OVERNIGHT EVENTS:    71y FemaleHPI:  71 years old female with history of COPD, Anemia, history of respiratory failure, anxiety, who was at [facility]. Patient was having increasing shortness of breath. She was sent in to ER for same. Patient was in respiratory distress and was placed  on BiPAP. She is admitted for further management.   Patient is currently on BiPAP and continues to c/o shortness of breath.   She denies any chest pain or pressure.  No nausea or vomiting.   No fever or chills.   No dizziness or syncope. (10 Rashawn 2017 14:32)        PAST MEDICAL & SURGICAL HISTORY:  Osteoporosis  Insomnia, unspecified type  Anxiety  Herpes zoster with other complication: Left arm.  Depression  Neuropathy  GERD (gastroesophageal reflux disease)  Chronic obstructive pulmonary disease, unspecified COPD type  Chronic obstructive pulmonary disease with acute lower respiratory infection  No significant past surgical history        ICU Vital Signs Last 24 Hrs  T(C): 36.8, Max: 37 (06-18 @ 21:30)  T(F): 98.2, Max: 98.6 (06-18 @ 21:30)  HR: 69 (64 - 96)  BP: 125/73 (109/68 - 138/75)  BP(mean): --  ABP: --  ABP(mean): --  RR: 18 (18 - 20)  SpO2: 97% (94% - 99%)    Qtts:     I&O's Summary    I & Os for current day (as of 19 Jun 2017 07:44)  =============================================  IN: 250 ml / OUT: 0 ml / NET: 250 ml          REVIEW OF SYSTEMS:    CONSTITUTIONAL: No fever, weight loss, or fatigue  EYES: No eye pain, visual disturbances, or discharge  ENMT:  No difficulty hearing, tinnitus, vertigo; No sinus or throat pain  NECK: No pain or stiffness  BREASTS: No pain, masses, or nipple discharge  RESPIRATORY: No cough Some wheezing, chills or hemoptysis; mild shortness of breath  CARDIOVASCULAR: No chest pain, palpitations, dizziness, or leg swelling  GASTROINTESTINAL: No abdominal or epigastric pain. No nausea, vomiting, or hematemesis; No diarrhea or constipation. No melena or hematochezia.  GENITOURINARY: No dysuria, frequency, hematuria, or incontinence  NEUROLOGICAL: No headaches, memory loss, loss of strength, numbness, or tremors  SKIN: No itching, burning, rashes, or lesions   LYMPH NODES: No enlarged glands  ENDOCRINE: No heat or cold intolerance; No hair loss  MUSCULOSKELETAL: No joint pain or swelling; No muscle, back, or extremity pain, no calf tenderness  PSYCHIATRIC: No depression, anxiety, mood swings, or difficulty sleeping  HEME/LYMPH: No easy bruising, or bleeding gums  ALLERGY AND IMMUNOLOGIC: No hives or eczema      PHYSICAL EXAM:    GENERAL: NAD, well-groomed, well-developed, NAD on bipap  HEAD:  Atraumatic, Normocephalic  EYES: EOMI, PERRLA, conjunctiva and sclera clear  ENMT: No tonsillar erythema, exudates, or enlargement; Moist mucous membranes, Good dentition, No lesions  NECK: Supple, No JVD, Normal thyroid  NERVOUS SYSTEM:  Alert & Oriented X3, Good concentration; Motor Strength 5/5 B/L upper and lower extremities  CHEST/LUNG: Few bilat wheeze, rhonchi. Good excursion. No change percussion, fremitus  HEART: Regular rate and rhythm; No murmurs, rubs, or gallops  ABDOMEN: Soft, Nontender, Nondistended; Bowel sounds present  EXTREMITIES:  2+ Peripheral Pulses, No clubbing, cyanosis, or edema  LYMPH: No lymphadenopathy noted  SKIN: No rashes or lesions      LABS:                vanco through     RADIOLOGY & ADDITIONAL STUDIES:      CRITICAL CARE TIME SPENT:

## 2017-06-19 NOTE — DISCHARGE NOTE ADULT - SECONDARY DIAGNOSIS.
COPD exacerbation Bronchitis Anxiety Depression Gastroesophageal reflux disease, esophagitis presence not specified Insomnia, unspecified type

## 2017-06-19 NOTE — PROGRESS NOTE ADULT - PROBLEM SELECTOR PLAN 5
Continue Calcium with Vitamin D.  Resume Fosamax on discharge,

## 2017-06-19 NOTE — DISCHARGE NOTE ADULT - CARE PLAN
Principal Discharge DX:	Acute on chronic respiratory failure with hypercapnia  Goal:	Breath better.  Instructions for follow-up, activity and diet:	Mechanical soft with nectar thick liquids.  Increase activity at [facility].  Fall precautions.  BiPAP at 14/5 with 35% FiO2 at night and as needed during the day.  Follow up with Dr. Anderson at [facility].  Secondary Diagnosis:	COPD exacerbation  Secondary Diagnosis:	Bronchitis  Secondary Diagnosis:	Anxiety  Secondary Diagnosis:	Depression  Secondary Diagnosis:	Gastroesophageal reflux disease, esophagitis presence not specified  Secondary Diagnosis:	Insomnia, unspecified type Principal Discharge DX:	Acute on chronic respiratory failure with hypercapnia  Goal:	Breath better.  Instructions for follow-up, activity and diet:	Mechanical soft with nectar thick liquids.  Increase activity at [facility].  Fall precautions.  BiPAP at 14/5 with 35% FiO2 at night and as needed during the day.  Follow up with Dr. Anderson at [facility].  Secondary Diagnosis:	COPD exacerbation  Secondary Diagnosis:	Bronchitis  Secondary Diagnosis:	Anxiety  Secondary Diagnosis:	Depression  Secondary Diagnosis:	Gastroesophageal reflux disease, esophagitis presence not specified  Secondary Diagnosis:	Insomnia, unspecified type Principal Discharge DX:	Acute on chronic respiratory failure with hypercapnia  Goal:	Breath better.  Instructions for follow-up, activity and diet:	Mechanical soft with nectar thick liquids.  Increase activity at [facility].  Fall precautions.  BiPAP at 14/5 with 35% FiO2 at night and as needed during the day.  Follow up with Dr. Anderson at [facility].  Secondary Diagnosis:	COPD exacerbation  Secondary Diagnosis:	Bronchitis  Secondary Diagnosis:	Anxiety  Secondary Diagnosis:	Depression  Secondary Diagnosis:	Gastroesophageal reflux disease, esophagitis presence not specified  Secondary Diagnosis:	Insomnia, unspecified type

## 2018-07-27 ENCOUNTER — OUTPATIENT (OUTPATIENT)
Dept: OUTPATIENT SERVICES | Facility: HOSPITAL | Age: 73
LOS: 1 days | End: 2018-07-27
Payer: MEDICARE

## 2018-07-27 DIAGNOSIS — R91.8 OTHER NONSPECIFIC ABNORMAL FINDING OF LUNG FIELD: ICD-10-CM

## 2018-07-27 PROCEDURE — 71250 CT THORAX DX C-: CPT | Mod: 26

## 2018-07-27 PROCEDURE — 71250 CT THORAX DX C-: CPT

## 2021-01-11 NOTE — DIETITIAN INITIAL EVALUATION ADULT. - NS AS NUTRI INTERV MEALS SNACK
The procedure was performed independently
rec: soft dash/tlc diet c ensure pudding tid/General/healthful diet/Texture-modified diet/Fat - modified diet/Mineral - modified diet

## 2022-01-27 NOTE — H&P ADULT - NSHPREVIEWOFSYSTEMS_GEN_ALL_CORE
REVIEW OF SYSTEMS:  CONSTITUTIONAL: No fever, weight loss, or fatigue  EYES: No eye pain, visual disturbances, or discharge  ENMT:  No difficulty hearing, tinnitus, vertigo; No sinus or throat pain  NECK: No pain or stiffness  BREASTS: No pain, masses, or nipple discharge  RESPIRATORY: + shortness of breath  CARDIOVASCULAR: No chest pain, palpitations, dizziness, or leg swelling  GASTROINTESTINAL: No abdominal or epigastric pain. No nausea, vomiting, or hematemesis; No diarrhea or constipation. No melena or hematochezia.  GENITOURINARY: No dysuria, frequency, hematuria, or incontinence  NEUROLOGICAL: No headaches, memory loss, loss of strength, numbness, or tremors  SKIN: No itching, burning, rashes, or lesions   LYMPH NODES: No enlarged glands  ENDOCRINE: No heat or cold intolerance; No hair loss; No polydipsia or polyuria  MUSCULOSKELETAL: No joint pain or swelling; No muscle, back, or extremity pain  PSYCHIATRIC: No depression, anxiety, mood swings, or difficulty sleeping  HEME/LYMPH: No easy bruising, or bleeding gums  ALLERGY AND IMMUNOLOGIC: No hives or eczema Detail Level: Zone

## 2022-07-24 NOTE — ED ADULT NURSE NOTE - CARDIO WDL
Normal rate, regular rhythm, normal S1, S2 heart sounds heard. Regular rate and rhythm, Heart sounds S1 S2 present, no murmurs, rubs or gallops

## 2022-07-28 RX ORDER — FLUTICASONE PROPIONATE 50 MCG
1 SPRAY, SUSPENSION NASAL
Qty: 0 | Refills: 0 | DISCHARGE

## 2022-07-28 RX ORDER — ALENDRONATE SODIUM 70 MG/1
1 TABLET ORAL
Qty: 0 | Refills: 0 | DISCHARGE

## 2022-07-28 RX ORDER — POLYETHYLENE GLYCOL 3350 17 G/17G
0 POWDER, FOR SOLUTION ORAL
Qty: 0 | Refills: 0 | DISCHARGE

## 2022-07-28 RX ORDER — MONTELUKAST 4 MG/1
1 TABLET, CHEWABLE ORAL
Qty: 0 | Refills: 0 | DISCHARGE

## 2022-07-28 RX ORDER — EPINEPHRINE 0.3 MG/.3ML
3 INJECTION INTRAMUSCULAR; SUBCUTANEOUS
Qty: 0 | Refills: 0 | DISCHARGE

## 2022-07-28 RX ORDER — BUDESONIDE AND FORMOTEROL FUMARATE DIHYDRATE 160; 4.5 UG/1; UG/1
2 AEROSOL RESPIRATORY (INHALATION)
Qty: 0 | Refills: 0 | DISCHARGE

## 2022-07-28 RX ORDER — GABAPENTIN 400 MG/1
1 CAPSULE ORAL
Qty: 0 | Refills: 0 | DISCHARGE

## 2022-07-28 RX ORDER — ACETAMINOPHEN 500 MG
2 TABLET ORAL
Qty: 0 | Refills: 0 | DISCHARGE

## 2022-07-28 RX ORDER — ALPRAZOLAM 0.25 MG
0 TABLET ORAL
Qty: 0 | Refills: 0 | DISCHARGE

## 2022-07-28 RX ORDER — TRAZODONE HCL 50 MG
25 TABLET ORAL
Qty: 0 | Refills: 0 | DISCHARGE

## 2022-07-28 RX ORDER — FAMOTIDINE 10 MG/ML
1 INJECTION INTRAVENOUS
Qty: 0 | Refills: 0 | DISCHARGE

## 2022-07-28 RX ORDER — OXYCODONE HYDROCHLORIDE 5 MG/1
1 TABLET ORAL
Qty: 0 | Refills: 0 | DISCHARGE

## 2022-07-28 RX ORDER — FERROUS SULFATE 325(65) MG
0 TABLET ORAL
Qty: 0 | Refills: 0 | DISCHARGE

## 2022-07-28 RX ORDER — FAMOTIDINE 10 MG/ML
40 INJECTION INTRAVENOUS
Qty: 0 | Refills: 0 | DISCHARGE

## 2022-07-28 RX ORDER — BENZOYL PEROXIDE MICRONIZED 5.8 %
0 TOWELETTE (EA) TOPICAL
Qty: 0 | Refills: 0 | DISCHARGE

## 2022-07-28 RX ORDER — IPRATROPIUM/ALBUTEROL SULFATE 18-103MCG
0 AEROSOL WITH ADAPTER (GRAM) INHALATION
Qty: 0 | Refills: 0 | DISCHARGE

## 2023-06-16 NOTE — H&P ADULT - PROBLEM SELECTOR PLAN 2
Admission Patient with COPD exacerbation.  Will place on IV steroids and bronchodilators.   Pulmonary consult noted and appreciated.  Further management as per patient's clinical course.

## 2023-06-19 NOTE — H&P ADULT - NSHPATTENDINGPLANDISCUSS_GEN_ALL_CORE
No too close to surgery   Patient and nursing. Rhofade Counseling: Rhofade is a topical medication which can decrease superficial blood flow where applied. Side effects are uncommon and include stinging, redness and allergic reactions.

## 2023-08-05 NOTE — INPATIENT CERTIFICATION FOR MEDICARE PATIENTS - RISKS OF ADVERSE EVENTS
counseled on care, return precautions given Concern for cardiopulmonary deterioration/Concern for delay in diagnosis and treatment

## 2024-11-14 NOTE — PROGRESS NOTE ADULT - PROVIDER SPECIALTY LIST ADULT
Problem: Safety - Adult  Goal: Free from fall injury  11/13/2024 1932 by Vasyl López, RN  Outcome: Progressing  11/13/2024 1223 by Dee Dee Hall, RN  Outcome: Progressing     Problem: Skin/Tissue Integrity  Goal: Absence of new skin breakdown  Description: 1.  Monitor for areas of redness and/or skin breakdown  2.  Assess vascular access sites hourly  3.  Every 4-6 hours minimum:  Change oxygen saturation probe site  4.  Every 4-6 hours:  If on nasal continuous positive airway pressure, respiratory therapy assess nares and determine need for appliance change or resting period.  11/13/2024 1932 by Vasyl López, RN  Outcome: Progressing  11/13/2024 1223 by Dee Dee Hall, RN  Outcome: Progressing     Problem: Discharge Planning  Goal: Discharge to home or other facility with appropriate resources  Outcome: Progressing     Problem: Pain  Goal: Verbalizes/displays adequate comfort level or baseline comfort level  Outcome: Progressing      Internal Medicine

## 2024-12-20 NOTE — GOALS OF CARE CONVERSATION - PERSONAL ADVANCE DIRECTIVE - CONVERSATION/DISCUSSION
Writer left detailed message that conveyed results and practioner recommendations to patient.  Advised to call back with any questions or concerns, number provided.   Resucitation